# Patient Record
Sex: MALE | Race: WHITE | NOT HISPANIC OR LATINO | Employment: OTHER | URBAN - METROPOLITAN AREA
[De-identification: names, ages, dates, MRNs, and addresses within clinical notes are randomized per-mention and may not be internally consistent; named-entity substitution may affect disease eponyms.]

---

## 2020-06-15 ENCOUNTER — TRANSFERRED RECORDS (OUTPATIENT)
Dept: HEALTH INFORMATION MANAGEMENT | Facility: CLINIC | Age: 70
End: 2020-06-15

## 2020-07-22 ENCOUNTER — TRANSFERRED RECORDS (OUTPATIENT)
Dept: HEALTH INFORMATION MANAGEMENT | Facility: CLINIC | Age: 70
End: 2020-07-22

## 2020-11-13 ENCOUNTER — TRANSFERRED RECORDS (OUTPATIENT)
Dept: HEALTH INFORMATION MANAGEMENT | Facility: CLINIC | Age: 70
End: 2020-11-13

## 2021-02-11 ENCOUNTER — TRANSFERRED RECORDS (OUTPATIENT)
Dept: HEALTH INFORMATION MANAGEMENT | Facility: CLINIC | Age: 71
End: 2021-02-11

## 2021-02-16 ENCOUNTER — TRANSFERRED RECORDS (OUTPATIENT)
Dept: HEALTH INFORMATION MANAGEMENT | Facility: CLINIC | Age: 71
End: 2021-02-16

## 2021-03-02 ENCOUNTER — PATIENT OUTREACH (OUTPATIENT)
Dept: SURGERY | Facility: CLINIC | Age: 71
End: 2021-03-02

## 2021-03-02 ENCOUNTER — DOCUMENTATION ONLY (OUTPATIENT)
Dept: ONCOLOGY | Facility: CLINIC | Age: 71
End: 2021-03-02

## 2021-03-02 ENCOUNTER — TRANSCRIBE ORDERS (OUTPATIENT)
Dept: OTHER | Age: 71
End: 2021-03-02

## 2021-03-02 DIAGNOSIS — C18.9 MALIGNANT NEOPLASM OF COLON (H): ICD-10-CM

## 2021-03-02 DIAGNOSIS — C22.9 ADENOCARCINOMA OF LIVER (H): Primary | ICD-10-CM

## 2021-03-02 NOTE — TELEPHONE ENCOUNTER
New Patient Oncology Nurse Navigator Note     Referring provider: No primary care provider on file.    Referring Clinic/Organization: [facility]      Referred to: Surgical Oncology -  Hepatobiliary / GI Cancers     Requested provider (if applicable): Dr. Nikolay Chahal    Referral Received: 03/02/21       Evaluation for : Colon cancer with liver mets      Clinical History (per Nurse review of records provided):      - evaluation for microwave ablation of liver tumors.       No past medical history on file.    No past surgical history on file.    No current outpatient medications on file.         Not on File       Clinical Assessment / Barriers to Care (Per Nurse):    None at this time.     Records Location:     [facility] Everywhere   Faxed - Media tab/Scanned     Records Needed:     ABDOMINAL IMAGING (CT SCANS, MRI, US)  DATING BACK TO 2019      Additional testing needed prior to consult:     NONE AT THIS TIME    Referral updates and Plan:       Consult with Surgical Oncology         Krystle Rodriguez, RN, BSN   Surgical Oncology New Patient Nurse Navigator  [facility]  [phone]

## 2021-03-05 ENCOUNTER — VIRTUAL VISIT (OUTPATIENT)
Dept: SURGERY | Facility: CLINIC | Age: 71
End: 2021-03-05
Attending: SURGERY
Payer: COMMERCIAL

## 2021-03-05 DIAGNOSIS — C18.9 ADENOCARCINOMA OF COLON METASTATIC TO LIVER (H): Primary | ICD-10-CM

## 2021-03-05 DIAGNOSIS — C78.7 ADENOCARCINOMA OF COLON METASTATIC TO LIVER (H): Primary | ICD-10-CM

## 2021-03-05 PROCEDURE — 99203 OFFICE O/P NEW LOW 30 MIN: CPT | Mod: 95 | Performed by: SURGERY

## 2021-03-05 PROCEDURE — 999N001193 HC VIDEO/TELEPHONE VISIT; NO CHARGE

## 2021-03-05 RX ORDER — BISACODYL 5 MG/1
5 TABLET, DELAYED RELEASE ORAL PRN
COMMUNITY
Start: 2020-12-21 | End: 2021-05-20

## 2021-03-05 RX ORDER — ONDANSETRON 8 MG/1
8 TABLET, FILM COATED ORAL PRN
COMMUNITY
Start: 2021-02-16 | End: 2021-03-16

## 2021-03-05 RX ORDER — SPIRONOLACT/HYDROCHLOROTHIAZID 25 MG-25MG
2 TABLET ORAL EVERY MORNING
COMMUNITY

## 2021-03-05 RX ORDER — LORAZEPAM 0.5 MG/1
0.5 TABLET ORAL PRN
COMMUNITY
Start: 2021-02-01 | End: 2021-03-16

## 2021-03-05 RX ORDER — PROCHLORPERAZINE MALEATE 10 MG
10 TABLET ORAL PRN
COMMUNITY
Start: 2020-12-08 | End: 2021-03-16

## 2021-03-05 RX ORDER — DEXAMETHASONE 4 MG/1
4 TABLET ORAL PRN
COMMUNITY
Start: 2021-02-16 | End: 2021-03-16

## 2021-03-05 RX ORDER — METOPROLOL SUCCINATE 50 MG/1
50 TABLET, EXTENDED RELEASE ORAL AT BEDTIME
COMMUNITY
Start: 2020-09-14

## 2021-03-05 RX ORDER — TAMSULOSIN HYDROCHLORIDE 0.4 MG/1
0.4 CAPSULE ORAL AT BEDTIME
COMMUNITY
Start: 2021-01-04

## 2021-03-05 NOTE — PROGRESS NOTES
Hernandez is a 70 year old who is being evaluated via a billable video visit.      How would you like to obtain your AVS? Mail a copy  If the video visit is dropped, the invitation should be resent by: Send to e-mail at: [email or web address]  Will anyone else be joining your video visit? No      Video Start Time: 9:46 AM  Video-Visit Details    Type of service:  Video Visit    Video End Time:10:11 AM  VIRTUAL VISIT      NEW PATIENT VISIT      REASON FOR EVALUATION:  Recurrent metastatic colorectal cancer to the liver.      HISTORY OF PRESENT ILLNESS:  Mr. Aragon is a 70-year-old gentleman who was initially diagnosed with colon cancer back in 2013.  He had a T2 N0 ascending colon cancer which was resected.  Subsequently, he was found to have liver metastases, which was resected up in [place].  There were 2 lesions, one in segment 5 and one in segment 6.  He received adjuvant chemotherapy and completed that in 2014.  Subsequent to that, he did have recurrence in his left lower lobe, which was treated with left lower lobe lobectomy in 2016.  He has been following with medical oncology since that time.  More recently last July, hepatic recurrence was noted in segment 7 of the liver.  The patient received up-front chemotherapy and was eventually referred to the [facility] for [facility] of liver-directed surgical therapy.  [facility] at the [facility] for discussion regarding liver-directed surgical resection versus ablation.      Overall, Mr. Aragon is feeling well and does not have any specific cancer related complaints.  He has what appears to be a solitary metastasis in segment 7/8 of his liver on the dome, which lies immediately adjacent to his middle hepatic vein.  He did have an outside MRI, but the images were not available for my review today so I am reviewing a CT scan that was dated 11/2020.  I suspect given his receipt of chemotherapy this  lesion may even be smaller; however, the most recent imaging is not available to me at this point.      I discussed with Mr. Aragon that I certainly would agree that liver-directed surgical therapy would be appropriate in his case, either in the form of a minimally invasive resection or ablation of this tumor.  If the tumor has diminished in size to less than 3 cm, I would probably favor ablation given that this is his third recurrence, but that will depend on the tumor itself once we get his most recent imaging sent to us.  I did discuss with Mr. Aragon the risks of both resection and/or ablation leading up to surgery.  I discussed risks that include but are not limited to bleeding, infection, bile leak, venous thromboembolism, risks of unintentional injury to other structures and a small risk of requiring an open surgical procedure.  I offered that we would get his MRI sent to us as quickly as we could so we could review it and form a more definitive plan with him.      We will go ahead and get Mr. Aragon's MRI imaging into our system and we will plan to reach out to him to confirm a final plan as soon as we are able to do that.      A total of 25 minutes was spent on virtual video visit with Mr. Aragon.  An additional 15 minutes was spent in review of his history and available imaging.       Originating Location (pt. Location): Home    Distant Location (provider location):  [facility]     Platform used for Video Visit: Guerda     SEND EMAIL TO PT TO START VISIT    Carolyn Conley CMA on 3/5/2021 at 9:11 AM

## 2021-03-05 NOTE — LETTER
3/5/2021         RE: Hernandez Aragon  [address]  [place] [address]        Dear Colleague,    Thank you for referring your patient, Hernandez Aragon, to the [facility]. Please see a copy of my visit note below.    Hernandez is a 70 year old who is being evaluated via a billable video visit.      How would you like to obtain your AVS? Mail a copy  If the video visit is dropped, the invitation should be resent by: Send to e-mail at: [email or web address]  Will anyone else be joining your video visit? No      Video Start Time: 9:46 AM  Video-Visit Details    Type of service:  Video Visit    Video End Time:10:11 AM   Dictation on: 03/08/2021  8:52 AM by: RIN ZABALA [[id number]]       Originating Location (pt. Location): Home    Distant Location (provider location):  [facility]     Platform used for Video Visit: Affirm     SEND EMAIL TO PT TO START VISIT    Carolyn Conley CMA on 3/5/2021 at 9:11 AM      VIRTUAL VISIT      NEW PATIENT VISIT      REASON FOR EVALUATION:  Recurrent metastatic colorectal cancer to the liver.      HISTORY OF PRESENT ILLNESS:  Mr. Aragon is a 70-year-old gentleman who was initially diagnosed with colon cancer back in 2013.  He had a T2 N0 ascending colon cancer which was resected.  Subsequently, he was found to have liver metastases, which was resected up in [place].  There were 2 lesions, one in segment 5 and one in segment 6.  He received adjuvant chemotherapy and completed that in 2014.  Subsequent to that, he did have recurrence in his left lower lobe, which was treated with left lower lobe lobectomy in 2016.  He has been following with medical oncology since that time.  More recently last July, hepatic recurrence was noted in segment 7 of the liver.  The patient received up-front chemotherapy and was eventually referred to the [facility] for consideration of liver-directed surgical therapy.  [facility] at the [facility] for discussion regarding liver-directed surgical resection versus ablation.      Overall, Mr. Aragon is feeling well and does not have any specific cancer related complaints.  He has what appears to be a solitary metastasis in segment 7/8 of his liver on the dome, which lies immediately adjacent to his middle hepatic vein.  He did have an outside MRI, but the images were not available for my review today so I am reviewing a CT scan that was dated 11/2020.  I suspect given his receipt of chemotherapy this lesion may even be smaller; however, the most recent imaging is not available to me at this point.      I discussed with Mr. Aragon that I certainly would agree that liver-directed surgical therapy would be appropriate in his case, either in the form of a minimally invasive resection or ablation of this tumor.  If the tumor has diminished in size to less than 3 cm, I would probably favor ablation given that this is his third recurrence, but that will depend on the tumor itself once we get his most recent imaging sent to us.  I did discuss with Mr. Aragon the risks of both resection and/or ablation leading up to surgery.  I discussed risks that include but are not limited to bleeding, infection, bile leak, venous thromboembolism, risks of unintentional injury to other structures and a small risk of requiring an open surgical procedure.  I offered that we would get his MRI sent to us as quickly as we could so we could review it and form a more definitive plan with him.      We will go ahead and get Mr. Aragon's MRI imaging into our system and we will plan to reach out to him to confirm a final plan as soon as we are able to do that.      A total of 25 minutes was spent on virtual video visit with Mr. Aragon.  An additional 15 minutes was spent in review of his history and available imaging.       Again, thank you for allowing me to  participate in the care of your patient.        Sincerely,        Nikolay Chahal MD

## 2021-03-05 NOTE — PROGRESS NOTES
[facility], most recent office notes and pertinent records are copied below for office visit and provider review.

## 2021-03-10 ENCOUNTER — PREP FOR PROCEDURE (OUTPATIENT)
Dept: SURGERY | Facility: CLINIC | Age: 71
End: 2021-03-10

## 2021-03-10 DIAGNOSIS — C18.9 COLON CANCER METASTASIZED TO LIVER (H): Primary | ICD-10-CM

## 2021-03-10 DIAGNOSIS — C78.7 COLON CANCER METASTASIZED TO LIVER (H): Primary | ICD-10-CM

## 2021-03-11 NOTE — PROGRESS NOTES
Surgical Oncology RN Care Coordination Note:     Patient returned call and stated he his last chemotherapy infusion was 2/2/2021. Informed him that we are going to work on figure out OR date ASAP to get him scheduled as he has now been off treatment for about 5 weeks. Informed him that our scheduling team will be in touch to discuss date/time and also assist in scheduling PAC visit as well as pre procedure COVID testing. He verbalized understanding and is aware we will be reaching out to confirm surgical appointments.     Krystle Rodriguez RN, BSN  Care Coordinator

## 2021-03-11 NOTE — PROGRESS NOTES
Surgical Oncology RN Care Coordination Note:     Called and left a message for patient to call our office back to discuss recommendations for surgery and timing now that imaging has been received. Left my direct number for patient to call back.     Krystle Rodriguez RN, BSN  Care Coordinator

## 2021-03-15 ENCOUNTER — TELEPHONE (OUTPATIENT)
Dept: SURGERY | Facility: CLINIC | Age: 71
End: 2021-03-15

## 2021-03-15 ENCOUNTER — PATIENT OUTREACH (OUTPATIENT)
Dept: SURGERY | Facility: CLINIC | Age: 71
End: 2021-03-15

## 2021-03-15 PROBLEM — C78.7 COLON CANCER METASTASIZED TO LIVER (H): Status: ACTIVE | Noted: 2021-03-15

## 2021-03-15 PROBLEM — C18.9 COLON CANCER METASTASIZED TO LIVER (H): Status: ACTIVE | Noted: 2021-03-15

## 2021-03-15 NOTE — TELEPHONE ENCOUNTER
FUTURE VISIT INFORMATION      SURGERY INFORMATION:    Date: 21    Location: [facility] OR    Surgeon:  Tirso    Anesthesia Type:  General    Procedure: Laparoscopic partial hepatectomy, intraoperative ultrasound,    Consult: 3.5.21    RECORDS REQUESTED FROM:       Primary Care Provider: Simone    Most recent EKG+ Tracin20 [facility]    Action 3.15.21 MJ   Action Taken [facility]

## 2021-03-15 NOTE — PROGRESS NOTES
Surgical Oncology RN Care Coordination Note:     Received call from FRIDA Goodson at the oncology office confirming patient received chemotherapy on 2/16 and was disconnected on 2/18. Requested office note from Dr. Chahal visit is faxed to [phone], completed.     Orders placed or OR per Dr. Chahal, plan is for laparoscopic possible hand assisted partial hepatectomy with IOUS, possible microwave ablation of liver tumor.     Krystle Rodriguez RN, BSN  Care Coordinator

## 2021-03-15 NOTE — TELEPHONE ENCOUNTER
Received orders to schedule surgery with Dr. Chahal.    He is set for virtual PAC tomorrow at 9 AM.    I did let him know that we are potentially looking at 3/23 for surgery and he told me he has his second COVID vaccine is that date.     He is aware that he might not hear from us until tomorrow.    Sent message regarding COVID vaccine to the nurse.

## 2021-03-15 NOTE — TELEPHONE ENCOUNTER
Surgery is scheduled with Dr. Chahal on 4/6 at [facility].  Scheduled per orders.    H&P: to be completed by PAC: virtual visit 3/16    Additional appointments:   NO ADDITIONAL APPOINTMENTS NEEDED AT THIS TIME    [facility], Hernandez will let me know if he needs orders faxed    Post-op: 4/19 as a in person visit.    The RN completed the education regarding the surgery.     Patient will receive surgery arrival and start time from PAC.    The surgery packet was sent via US mail.    Patient will contact clinic near home to schedule COVID-19 test 2-4 days prior to surgery.    I called the patient and was able to confirm the scheduled information above.

## 2021-03-16 ENCOUNTER — VIRTUAL VISIT (OUTPATIENT)
Dept: SURGERY | Facility: CLINIC | Age: 71
End: 2021-03-16
Payer: COMMERCIAL

## 2021-03-16 ENCOUNTER — ANESTHESIA EVENT (OUTPATIENT)
Dept: SURGERY | Facility: CLINIC | Age: 71
End: 2021-03-16

## 2021-03-16 ENCOUNTER — PRE VISIT (OUTPATIENT)
Dept: SURGERY | Facility: CLINIC | Age: 71
End: 2021-03-16

## 2021-03-16 ENCOUNTER — TELEPHONE (OUTPATIENT)
Dept: SURGERY | Facility: CLINIC | Age: 71
End: 2021-03-16

## 2021-03-16 DIAGNOSIS — C18.9 COLON CANCER METASTASIZED TO LIVER (H): ICD-10-CM

## 2021-03-16 DIAGNOSIS — C78.7 COLON CANCER METASTASIZED TO LIVER (H): ICD-10-CM

## 2021-03-16 DIAGNOSIS — Z01.818 PRE-OP EVALUATION: Primary | ICD-10-CM

## 2021-03-16 PROCEDURE — 99204 OFFICE O/P NEW MOD 45 MIN: CPT | Mod: 95 | Performed by: PHYSICIAN ASSISTANT

## 2021-03-16 SDOH — HEALTH STABILITY: MENTAL HEALTH: HOW OFTEN DO YOU HAVE A DRINK CONTAINING ALCOHOL?: NOT ASKED

## 2021-03-16 SDOH — HEALTH STABILITY: MENTAL HEALTH: HOW MANY STANDARD DRINKS CONTAINING ALCOHOL DO YOU HAVE ON A TYPICAL DAY?: NOT ASKED

## 2021-03-16 SDOH — HEALTH STABILITY: MENTAL HEALTH: HOW OFTEN DO YOU HAVE 6 OR MORE DRINKS ON ONE OCCASION?: NOT ASKED

## 2021-03-16 ASSESSMENT — PAIN SCALES - GENERAL: PAINLEVEL: NO PAIN (0)

## 2021-03-16 ASSESSMENT — LIFESTYLE VARIABLES: TOBACCO_USE: 0

## 2021-03-16 NOTE — PROGRESS NOTES
Hernandez is a 70 year old who is being evaluated via a billable video visit.      How would you like to obtain your AVS? Mail a copy  If the video visit is dropped, the invitation should be resent by: Send to e-mail at: [email or web address]  Will anyone else be joining your video visit? wife    HPI         Review of Systems         Objective    Vitals - Patient Reported  Pain Score: No Pain (0)        Physical Exam     SRIRAM Sanchez LPN

## 2021-03-16 NOTE — H&P
Pre-Operative H & P         Video-Visit Details    Type of service:  Video Visit    Patient verbally consented to video service today: YES      Video Start Time: 0919  Video End Time (time video stopped): 0939    Originating Location (pt. Location): Home    Distant Location (provider location):  [facility] PREOPERAT[facility]     Mode of Communication:  Video Conference via PanÃ¨ve        CC:  Preoperative exam to assess for increased cardiopulmonary risk while undergoing surgery and anesthesia.    Date of Encounter: 3/16/2021  Primary Care Physician:  Michael Nichols  Associated diagnosis: colon cancer with liver mets    HPI  Hernandez Aragon is a 70 year old male who presents for pre-operative H & P in preparation for Laparoscopic partial hepatectomy, intraoperative ultrasound, possible microwave ablation of liver tumor with Dr. Chahal on 4/6/21 at [facility]. Patient is being evaluated for comorbid conditions of hypertension, thrombocytopenia    Mr. Aragon has history of colon cancer diagnosed in 2013. He is s/p resection. He was then found to have liver mets and underwent resection in [place]. he completed chemotherapy in 2014. He subsequently had recurrence in his LLL and is s/p LLL lobectomy in 2016. He has been following with medical oncology. He was found to have recurrence in his liver 7/2020. [facility] underwent [facility] but was then referred to Dr. Chahal to discuss surgical intervention. Recommendation was made for minimally invasive resection or ablation, pending review of recent imaging. Surgery team is now working on scheduling the above procedure.    History is obtained from the patient, his wife and chart review.      Past Medical History  Past Medical History:   Diagnosis Date     HTN (hypertension)      Thrombocytopenia (H)        Past Surgical History  Past Surgical History:    Procedure Laterality Date     COLONOSCOPY       HEMICOLECTOMY, RT/LT       LOBECTOMY LUNG      left lower lobe       Hx of Blood transfusions/reactions: denies     Hx of abnormal bleeding or anti-platelet use: denies    Steroid use in the last year: denies    Personal or FH with difficulty with Anesthesia:  denies    Prior to Admission Medications  Current Outpatient Medications   Medication Sig Dispense Refill     alum & mag hydroxide-simethicone 80 mL, diphenhydrAMINE 80 mL, lidocaine 80 mL magic mouthwash Take 5 mLs by mouth as needed       bisacodyl (DULCOLAX) 5 MG EC tablet Take 5 mg by mouth as needed        metoprolol succinate ER (TOPROL-XL) 50 MG 24 hr tablet Take 50 mg by mouth At Bedtime        Multiple Vitamins-Minerals (VITRUM SENIOR) TABS Take 2 tablets by mouth every morning        tamsulosin (FLOMAX) 0.4 MG capsule Take 0.4 mg by mouth At Bedtime         Allergies  No Known Allergies    Social History  Social History     Socioeconomic History     Marital status:      Spouse name: Not on file     Number of children: Not on file     Years of education: Not on file     Highest education level: Not on file   Occupational History     Not on file   Social Needs     Financial resource strain: Not on file     Food insecurity     Worry: Not on file     Inability: Not on file     Transportation needs     Medical: Not on file     Non-medical: Not on file   Tobacco Use     Smoking status: Former Smoker     Smokeless tobacco: Never Used     Tobacco comment: distant history   Substance and Sexual Activity     Alcohol use: Not on file     Comment: infrequent     Drug use: Not Currently     Sexual activity: Not on file   Lifestyle     Physical activity     Days per week: Not on file     Minutes per session: Not on file     Stress: Not on file   Relationships     Social connections     Talks on phone: Not on file     Gets together: Not on file     Attends Jain service: Not on file     Active member of  club or organization: Not on file     Attends meetings of clubs or organizations: Not on file     Relationship status: Not on file     Intimate partner violence     Fear of current or ex partner: Not on file     Emotionally abused: Not on file     Physically abused: Not on file     Forced sexual activity: Not on file   Other Topics Concern     Not on file   Social History Narrative     Not on file       Family History  Family History   Problem Relation Age of Onset     Anesthesia Reaction No family hx of            ROS/MED HX  ENT/Pulmonary:  - neg pulmonary ROS  (-) tobacco use   Neurologic:  - neg neurologic ROS     Cardiovascular:     (+) Dyslipidemia hypertension----- (-) taking anticoagulants/antiplatelets   METS/Exercise Tolerance:     Hematologic: Comments: thrombocytopenia      Musculoskeletal:  - neg musculoskeletal ROS     GI/Hepatic: Comment: Colon cancer with liver mets      Renal/Genitourinary:       Endo:  - neg endo ROS     Psychiatric/Substance Use:  - neg psychiatric ROS     Infectious Disease:  - neg infectious disease ROS     Malignancy:   (+) Malignancy, History of GI.GI CA  Active status post Surgery and Chemo.        Other:            The complete review of systems is negative other than noted in the HPI or here.    0 lbs 0 oz  Data Unavailable   There is no height or weight on file to calculate BMI.       Physical Exam  Constitutional: Awake, alert, cooperative, no apparent distress, and appears stated age.  Respiratory: non labored breathing  Neuropsychiatric: Calm, cooperative. Normal affect.       Please refer to the physical examination documented by the anesthesiologist in the anesthesia record on the day of surgery    Labs: (personally reviewed)  2/16/21  hgb 16.1, hematocrit 49, WBC 6.9, platelets 144  Sodium 140, K+ 4.1, chloride 101, BUN 23, Cr 1.1, Glucose 145      Outside records reviewed from: care everywhere    ASSESSMENT and PLAN  Hernandez Aragon is a 70 year old male scheduled for  Laparoscopic partial hepatectomy, intraoperative ultrasound, possible microwave ablation of liver tumor on 4/6/21 by Dr. Chahal in treatment of colon cancer with liver mets.  PAC referral for risk assessment and optimization for anesthesia with comorbid conditions of hypertension, thrombocytopenia:    Pre-operative considerations:    1.  Cardiac:  Functional status- METS >4- regularly walks dog 1-1.25 miles, golfs. Denies cardiac symptoms. Hypertension using metoprolol. Intermediate risk surgery with 0.9% (RCRI #) risk of major adverse cardiac event.    2.  Pulm:  Airway feasible.  NELLI risk: intermediate. non smoker. Denies pulmonary symptoms. COVID testing per surgery team.  H/o left lower lobectomy- patient and his wife report he had chest pain after this procedure that was deemed to be related to the surgery and not cardiac in origin.     3.  GI:  Risk of PONV score = 2.  If > 2, anti-emetic intervention recommended. h/o colon cancer with liver mets. s/p resection and chemotherpay. Now with recurrence- one liver lesion s/p chemotherpay with the above procedure planned.    4. : BPH using flomax    5. ENT: Patient reports chronic sinus drainage. Denies infections requiring antibiotics. He will follow up with PCP.    6. Heme: thrombocytopenia. Most recent platelets 144 2/16/21. Patient will come in tomorrow for T&S- I will complete extension form.     7. Access: left chest port in place    VTE risk: 3%    Patient is optimized and is acceptable candidate for the proposed procedure.  No further diagnostic evaluation is needed.    **Physical exam and vital signs not completed today as this visit was scheduled as a virtual visit during Covid 19 pandemic. Physical exam should be completed the DOS in pre-op**    55 minutes were spent completing chart review, seeing the patient, reviewing labs and test results, discussing patient care with anesthesia      Krystle Sandoval PA-C  Preoperative Assessment Center  [facility]  [facility] [place]  Phone: [phone]  Fax: [phone]

## 2021-03-16 NOTE — TELEPHONE ENCOUNTER
[facility] Call Center    Phone Message    May a detailed message be left on voicemail: yes     Reason for Call: Other: Hernandez is calling in requesting to speak with someone regarding his upcoming procedure on 4/6. Please call him back as soon as you can.     Action Taken: Message routed to:  [facility] ([facility]  [facility] SUR[facility]    Travel Screening: Not Applicable

## 2021-03-16 NOTE — PATIENT INSTRUCTIONS
Preparing for Your Surgery      Name:  Hernandez Aragon   MRN:  [id number]   :  1950   Today's Date:  3/16/2021       Arriving for surgery:  Surgery date:  21  Arrival time:  12:30 pm    Restrictions due to COVID 19:  One consistent visitor per patient is allowed.  The visitor will be allowed in the pre-op area.  Visitors are asked to leave the building during the surgery.  No ill visitors.  All visitors must wear face mask.     parking is available for anyone with mobility limitations or disabilities.  ([facility]  24 hours/ 7 days a week; [facility]  7 am- 3:30 pm, Mon- Fri)    Please come to:     [facility] [address]  [address] [place] [address]    - ? parking is available in front of the hospital      -    Please proceed to [address] on the 3rd floor. [phone]?     - ?If you are in need of directions, wheelchair or escort please stop at the Information Desk in the lobby.  Inform the information person that you are here for surgery; a wheelchair and escort to [address] will be provided.?     What can I eat or drink?  -  You may eat and drink normally for up to 8 hours before your surgery. (Until 6:30 am)  -  You may have clear liquids until 2 hours before surgery. (Until 12:30 pm)    Examples of clear liquids:  Water  Clear broth  Juices (apple, white grape, white cranberry  and cider) without pulp  Noncarbonated, powder based beverages  (lemonade and Gume-Aid)  Sodas (Sprite, 7-Up, ginger ale and seltzer)  Coffee or tea (without milk or cream)  Gatorade    -  No Alcohol for at least 24 hours before surgery     Which medicines can I take?    Hold Aspirin for 7 days before surgery.   Hold Multivitamins for 7 days before surgery.  Hold Supplements for 7 days before surgery.  Hold Ibuprofen (Advil, Motrin) for 1 day before surgery--unless otherwise directed by surgeon.  Hold Naproxen (Aleve) for 4 days before surgery.    -   DO NOT take these medications the day of surgery:         Bisacodyl (Dulcolax)    -  PLEASE TAKE these medications the day of surgery:    Magic Mouthwash if needed    How do I prepare myself?  - Please take 2 showers before surgery using Scrubcare or Hibiclens soap.    Use this soap only from the neck to your toes.     Leave the soap on your skin for one minute--then rinse thoroughly.      You may use your own shampoo and conditioner; no other hair products.   - Please remove all jewelry and body piercings.  - No lotions, deodorants or fragrance.  - Bring your ID and insurance card.    - All patients are required to have a Covid-19 test within 4 days of surgery/procedure.      -Patients will be contacted by the [facility] scheduling team within 1 week of surgery to make an appointment.      - Patients may call the Scheduling team at [phone] if they have not been scheduled within 4 days of  surgery.      ALL PATIENTS GOING HOME THE SAME DAY OF SURGERY ARE REQUIRED TO HAVE A RESPONSIBLE ADULT TO DRIVE AND BE IN ATTENDANCE WITH THEM FOR 24 HOURS FOLLOWING SURGERY.    IF THE RESPONSIBLE ADULT IS REQUIRED FOR POST OP TEACHING THE POST OP RN WILL ASK THEM TO COME BACK TO THE RECOVERY AREA.    Questions or Concerns:    - For any questions regarding the day of surgery or your hospital stay, please contact the Pre Admission Nursing Office at [phone].       - If you have health changes between today and your surgery please call your surgeon.       For questions after surgery please call your surgeons office.

## 2021-03-16 NOTE — ANESTHESIA PREPROCEDURE EVALUATION
Anesthesia Pre-Procedure Evaluation    Patient: Hernandez Aragon   MRN: [id number] : 1950        Preoperative Diagnosis: * No surgery found *   Procedure :      No past medical history on file.   No past surgical history on file.   No Known Allergies   Social History     Tobacco Use     Smoking status: Not on file   Substance Use Topics     Alcohol use: Not on file      Wt Readings from Last 1 Encounters:   No data found for Wt        Anesthesia Evaluation   Pt has had prior anesthetic. Type: General.    No history of anesthetic complications       ROS/MED HX  ENT/Pulmonary:  - neg pulmonary ROS  (-) tobacco use   Neurologic:  - neg neurologic ROS     Cardiovascular:     (+) Dyslipidemia hypertension----- (-) taking anticoagulants/antiplatelets   METS/Exercise Tolerance: >4 METS Comment: Golfing, walking dog 1-1.25 miles   Hematologic: Comments: thrombocytopenia   (-) history of blood transfusion   Musculoskeletal:  - neg musculoskeletal ROS     GI/Hepatic: Comment: Colon cancer with liver mets      Renal/Genitourinary:     (+) BPH,     Endo:  - neg endo ROS     Psychiatric/Substance Use:  - neg psychiatric ROS     Infectious Disease:  - neg infectious disease ROS     Malignancy:   (+) Malignancy, History of GI.GI CA  Active status post Surgery and Chemo.        Other:               OUTSIDE LABS:  CBC: No results found for: WBC, HGB, HCT, PLT  BMP: No results found for: NA, POTASSIUM, CHLORIDE, CO2, BUN, CR, GLC  COAGS: No results found for: PTT, INR, FIBR  POC: No results found for: BGM, HCG, HCGS  HEPATIC: No results found for: ALBUMIN, PROTTOTAL, ALT, AST, GGT, ALKPHOS, BILITOTAL, BILIDIRECT, TIM  OTHER: No results found for: PH, LACT, A1C, UTE, PHOS, MAG, LIPASE, AMYLASE, TSH, T4, T3, CRP, SED          PAC Discussion and Assessment    ASA Classification: 3  Case is suitable for: [place]  Anesthetic techniques and relevant risks discussed: GA                  PAC Resident/NP Anesthesia Assessment:  Hernandez Aragon is a 70 year old male scheduled for Laparoscopic partial hepatectomy, intraoperative ultrasound, possible microwave ablation of liver tumor on 4/6/21 by Dr. Chahal in treatment of colon cancer with liver mets.  PAC referral for risk assessment and optimization for anesthesia with comorbid conditions of hypertension, thrombocytopenia:    Pre-operative considerations:    1.  Cardiac:  Functional status- METS >4- regularly walks dog 1-1.25 miles, golfs. Denies cardiac symptoms. Hypertension using metoprolol. Intermediate risk surgery with 0.9% (RCRI #) risk of major adverse cardiac event.    2.  Pulm:  Airway feasible.  NELLI risk: intermediate. non smoker. Denies pulmonary symptoms. COVID testing per surgery team.  H/o left lower lobectomy- patient and his wife report he had chest pain after this procedure that was deemed to be related to the surgery and not cardiac in origin.     3.  GI:  Risk of PONV score = 2.  If > 2, anti-emetic intervention recommended. h/o colon cancer with liver mets. s/p resection and chemotherpay. Now with recurrence- one liver lesion s/p chemotherpay with the above procedure planned.    4. : BPH using flomax    5. ENT: Patient reports chronic sinus drainage. Denies infections requiring antibiotics. He will follow up with PCP.    6. Heme: thrombocytopenia. Most recent platelets 144 2/16/21. Patient will come in tomorrow for T&S- I will complete extension form.     7. Access: left chest port in place    VTE risk: 3%    Patient is optimized and is acceptable candidate for the proposed procedure.  No further diagnostic evaluation is needed.    **Physical exam and vital signs not completed today as this visit was scheduled as a virtual visit during Covid 19 pandemic. Physical exam should be completed the DOS in pre-op**    **For further details of assessment and testing please see H and P completed on same date.      JOSEPH Nunes  ELISE Sandoval PA-C

## 2021-03-16 NOTE — TELEPHONE ENCOUNTER
Received message that patient has questions about surgery on 4/6.    Called and spoke with wife, Unique.    She said that they are wondering which procedure Dr. Chahal is doing, or if he won't know until he is actually in surgery.    Will route to Krystle, the nurse, to follow up.    They are available to talk this afternoon, but tomorrow morning is preferred.

## 2021-03-16 NOTE — TELEPHONE ENCOUNTER
Records received 03/16/21    Facility  [facility]   Outcome 3/20/2015 ECG with tracings sent to urgent scanning - [facility]

## 2021-03-17 ENCOUNTER — PREP FOR PROCEDURE (OUTPATIENT)
Dept: SURGERY | Facility: CLINIC | Age: 71
End: 2021-03-17

## 2021-03-17 ENCOUNTER — CARE COORDINATION (OUTPATIENT)
Dept: SURGERY | Facility: CLINIC | Age: 71
End: 2021-03-17

## 2021-03-17 DIAGNOSIS — Z01.818 PRE-OP EVALUATION: ICD-10-CM

## 2021-03-17 PROCEDURE — 86901 BLOOD TYPING SEROLOGIC RH(D): CPT | Performed by: PATHOLOGY

## 2021-03-17 PROCEDURE — 86850 RBC ANTIBODY SCREEN: CPT | Performed by: PATHOLOGY

## 2021-03-17 PROCEDURE — 36415 COLL VENOUS BLD VENIPUNCTURE: CPT | Performed by: PATHOLOGY

## 2021-03-17 PROCEDURE — 86900 BLOOD TYPING SEROLOGIC ABO: CPT | Performed by: PATHOLOGY

## 2021-03-17 NOTE — ADDENDUM NOTE
Addendum  created 03/17/21 0720 by Krystle Sandoval PA-C    Diagnosis association updated, Order list changed

## 2021-03-17 NOTE — PROGRESS NOTES
Called to let Mr. Aragon know that we got his outside scans. Tumor measures slightly smaller after chemo - 3.2cm. Based on history of recurrence and the location which abbutts the middle hepatic vein, I recommended ablation rather than resection. He was in agreement and we will continue as planned.

## 2021-03-22 DIAGNOSIS — Z11.59 ENCOUNTER FOR SCREENING FOR OTHER VIRAL DISEASES: ICD-10-CM

## 2021-03-30 ENCOUNTER — PATIENT OUTREACH (OUTPATIENT)
Dept: SURGERY | Facility: CLINIC | Age: 71
End: 2021-03-30

## 2021-03-30 NOTE — PROGRESS NOTES
Surgical Oncology RN Care Coordination Note:     Received call from [facility] regarding concern of authorization not on file for patients pending surgery. Per discussion with Community care and patient, patient [facility] insurance should be billed first before his supplemental insurance. Per community care they will resend authorization for surgical services to [phone].     Krystle Rodriguez RN, BSN  Care Coordinator

## 2021-04-05 ENCOUNTER — ANESTHESIA EVENT (OUTPATIENT)
Dept: SURGERY | Facility: CLINIC | Age: 71
End: 2021-04-05
Payer: COMMERCIAL

## 2021-04-05 LAB — Lab: NEGATIVE

## 2021-04-06 ENCOUNTER — ANCILLARY PROCEDURE (OUTPATIENT)
Dept: ULTRASOUND IMAGING | Facility: CLINIC | Age: 71
End: 2021-04-06
Payer: COMMERCIAL

## 2021-04-06 ENCOUNTER — HOSPITAL ENCOUNTER (OUTPATIENT)
Facility: CLINIC | Age: 71
Discharge: HOME | End: 2021-04-06
Attending: SURGERY | Admitting: SURGERY
Payer: COMMERCIAL

## 2021-04-06 ENCOUNTER — ANESTHESIA (OUTPATIENT)
Dept: SURGERY | Facility: CLINIC | Age: 71
End: 2021-04-06
Payer: COMMERCIAL

## 2021-04-06 VITALS
DIASTOLIC BLOOD PRESSURE: 93 MMHG | RESPIRATION RATE: 17 BRPM | HEART RATE: 81 BPM | SYSTOLIC BLOOD PRESSURE: 168 MMHG | BODY MASS INDEX: 27.84 KG/M2 | OXYGEN SATURATION: 95 % | HEIGHT: 76 IN | WEIGHT: 228.62 LBS | TEMPERATURE: 97.9 F

## 2021-04-06 DIAGNOSIS — C18.9 COLON CANCER METASTASIZED TO LIVER (H): ICD-10-CM

## 2021-04-06 DIAGNOSIS — C78.7 COLON CANCER METASTASIZED TO LIVER (H): ICD-10-CM

## 2021-04-06 LAB
ABO + RH BLD: NORMAL
ABO + RH BLD: NORMAL
BLD GP AB SCN SERPL QL: NORMAL
BLOOD BANK CMNT PATIENT-IMP: NORMAL
BLOOD BANK CMNT PATIENT-IMP: NORMAL
CREAT SERPL-MCNC: 1.07 MG/DL (ref 0.66–1.25)
GFR SERPL CREATININE-BSD FRML MDRD: 69 ML/MIN/{1.73_M2}
GLUCOSE BLDC GLUCOMTR-MCNC: 108 MG/DL (ref 70–99)
HGB BLD-MCNC: 14.5 G/DL (ref 13.3–17.7)
INTERPRETATION ECG - MUSE: NORMAL
PLATELET # BLD AUTO: 108 10E9/L (ref 150–450)
POTASSIUM SERPL-SCNC: 3.9 MMOL/L (ref 3.4–5.3)
SPECIMEN EXP DATE BLD: NORMAL

## 2021-04-06 PROCEDURE — 82565 ASSAY OF CREATININE: CPT | Performed by: STUDENT IN AN ORGANIZED HEALTH CARE EDUCATION/TRAINING PROGRAM

## 2021-04-06 PROCEDURE — 93010 ELECTROCARDIOGRAM REPORT: CPT | Mod: 59 | Performed by: INTERNAL MEDICINE

## 2021-04-06 PROCEDURE — 370N000017 HC ANESTHESIA TECHNICAL FEE, PER MIN: Performed by: SURGERY

## 2021-04-06 PROCEDURE — 82962 GLUCOSE BLOOD TEST: CPT

## 2021-04-06 PROCEDURE — 250N000011 HC RX IP 250 OP 636: Performed by: ANESTHESIOLOGY

## 2021-04-06 PROCEDURE — 250N000025 HC SEVOFLURANE, PER MIN: Performed by: SURGERY

## 2021-04-06 PROCEDURE — 250N000011 HC RX IP 250 OP 636: Performed by: STUDENT IN AN ORGANIZED HEALTH CARE EDUCATION/TRAINING PROGRAM

## 2021-04-06 PROCEDURE — 250N000009 HC RX 250

## 2021-04-06 PROCEDURE — 84132 ASSAY OF SERUM POTASSIUM: CPT | Performed by: STUDENT IN AN ORGANIZED HEALTH CARE EDUCATION/TRAINING PROGRAM

## 2021-04-06 PROCEDURE — 710N000012 HC RECOVERY PHASE 2, PER MINUTE: Performed by: SURGERY

## 2021-04-06 PROCEDURE — 258N000003 HC RX IP 258 OP 636

## 2021-04-06 PROCEDURE — 999N000141 HC STATISTIC PRE-PROCEDURE NURSING ASSESSMENT: Performed by: SURGERY

## 2021-04-06 PROCEDURE — 360N000078 HC SURGERY LEVEL 5, PER MIN: Performed by: SURGERY

## 2021-04-06 PROCEDURE — 85018 HEMOGLOBIN: CPT | Performed by: STUDENT IN AN ORGANIZED HEALTH CARE EDUCATION/TRAINING PROGRAM

## 2021-04-06 PROCEDURE — 250N000011 HC RX IP 250 OP 636

## 2021-04-06 PROCEDURE — 258N000003 HC RX IP 258 OP 636: Performed by: STUDENT IN AN ORGANIZED HEALTH CARE EDUCATION/TRAINING PROGRAM

## 2021-04-06 PROCEDURE — 272N000001 HC OR GENERAL SUPPLY STERILE: Performed by: SURGERY

## 2021-04-06 PROCEDURE — 250N000011 HC RX IP 250 OP 636: Performed by: SURGERY

## 2021-04-06 PROCEDURE — C1888 ENDOVAS NON-CARDIAC ABL CATH: HCPCS | Performed by: SURGERY

## 2021-04-06 PROCEDURE — 250N000009 HC RX 250: Performed by: STUDENT IN AN ORGANIZED HEALTH CARE EDUCATION/TRAINING PROGRAM

## 2021-04-06 PROCEDURE — 36415 COLL VENOUS BLD VENIPUNCTURE: CPT | Performed by: STUDENT IN AN ORGANIZED HEALTH CARE EDUCATION/TRAINING PROGRAM

## 2021-04-06 PROCEDURE — 710N000010 HC RECOVERY PHASE 1, LEVEL 2, PER MIN: Performed by: SURGERY

## 2021-04-06 PROCEDURE — 85049 AUTOMATED PLATELET COUNT: CPT | Performed by: STUDENT IN AN ORGANIZED HEALTH CARE EDUCATION/TRAINING PROGRAM

## 2021-04-06 PROCEDURE — 258N000003 HC RX IP 258 OP 636: Performed by: ANESTHESIOLOGY

## 2021-04-06 PROCEDURE — 999N000054 HC STATISTIC EKG NON-CHARGEABLE

## 2021-04-06 RX ORDER — DEXAMETHASONE SODIUM PHOSPHATE 10 MG/ML
INJECTION, SOLUTION INTRAMUSCULAR; INTRAVENOUS PRN
Status: DISCONTINUED | OUTPATIENT
Start: 2021-04-06 | End: 2021-04-06

## 2021-04-06 RX ORDER — GLYCOPYRROLATE 0.2 MG/ML
INJECTION, SOLUTION INTRAMUSCULAR; INTRAVENOUS PRN
Status: DISCONTINUED | OUTPATIENT
Start: 2021-04-06 | End: 2021-04-06

## 2021-04-06 RX ORDER — HYDROMORPHONE HYDROCHLORIDE 1 MG/ML
.3-.5 INJECTION, SOLUTION INTRAMUSCULAR; INTRAVENOUS; SUBCUTANEOUS EVERY 10 MIN PRN
Status: DISCONTINUED | OUTPATIENT
Start: 2021-04-06 | End: 2021-04-06 | Stop reason: HOSPADM

## 2021-04-06 RX ORDER — ACETAMINOPHEN 325 MG/1
975 TABLET ORAL ONCE
Status: DISCONTINUED | OUTPATIENT
Start: 2021-04-06 | End: 2021-04-06 | Stop reason: HOSPADM

## 2021-04-06 RX ORDER — SODIUM CHLORIDE, SODIUM LACTATE, POTASSIUM CHLORIDE, CALCIUM CHLORIDE 600; 310; 30; 20 MG/100ML; MG/100ML; MG/100ML; MG/100ML
INJECTION, SOLUTION INTRAVENOUS CONTINUOUS
Status: DISCONTINUED | OUTPATIENT
Start: 2021-04-06 | End: 2021-04-06 | Stop reason: HOSPADM

## 2021-04-06 RX ORDER — LIDOCAINE HYDROCHLORIDE 20 MG/ML
INJECTION, SOLUTION INFILTRATION; PERINEURAL PRN
Status: DISCONTINUED | OUTPATIENT
Start: 2021-04-06 | End: 2021-04-06

## 2021-04-06 RX ORDER — OXYCODONE HYDROCHLORIDE 5 MG/1
5 TABLET ORAL
Status: DISCONTINUED | OUTPATIENT
Start: 2021-04-06 | End: 2021-04-06 | Stop reason: HOSPADM

## 2021-04-06 RX ORDER — NALOXONE HYDROCHLORIDE 0.4 MG/ML
0.4 INJECTION, SOLUTION INTRAMUSCULAR; INTRAVENOUS; SUBCUTANEOUS
Status: DISCONTINUED | OUTPATIENT
Start: 2021-04-06 | End: 2021-04-06 | Stop reason: HOSPADM

## 2021-04-06 RX ORDER — ACETAMINOPHEN 325 MG/1
650 TABLET ORAL
Status: DISCONTINUED | OUTPATIENT
Start: 2021-04-06 | End: 2021-04-06 | Stop reason: HOSPADM

## 2021-04-06 RX ORDER — NALOXONE HYDROCHLORIDE 0.4 MG/ML
0.2 INJECTION, SOLUTION INTRAMUSCULAR; INTRAVENOUS; SUBCUTANEOUS
Status: DISCONTINUED | OUTPATIENT
Start: 2021-04-06 | End: 2021-04-06 | Stop reason: HOSPADM

## 2021-04-06 RX ORDER — FENTANYL CITRATE 50 UG/ML
25-50 INJECTION, SOLUTION INTRAMUSCULAR; INTRAVENOUS
Status: DISCONTINUED | OUTPATIENT
Start: 2021-04-06 | End: 2021-04-06 | Stop reason: HOSPADM

## 2021-04-06 RX ORDER — CEFAZOLIN SODIUM 2 G/100ML
2 INJECTION, SOLUTION INTRAVENOUS SEE ADMIN INSTRUCTIONS
Status: DISCONTINUED | OUTPATIENT
Start: 2021-04-06 | End: 2021-04-06 | Stop reason: HOSPADM

## 2021-04-06 RX ORDER — HYDRALAZINE HYDROCHLORIDE 20 MG/ML
5 INJECTION INTRAMUSCULAR; INTRAVENOUS EVERY 10 MIN PRN
Status: COMPLETED | OUTPATIENT
Start: 2021-04-06 | End: 2021-04-06

## 2021-04-06 RX ORDER — OXYCODONE HYDROCHLORIDE 5 MG/1
5-10 TABLET ORAL EVERY 4 HOURS PRN
Qty: 30 TABLET | Refills: 0 | Status: SHIPPED | OUTPATIENT
Start: 2021-04-06 | End: 2021-05-20

## 2021-04-06 RX ORDER — PROPOFOL 10 MG/ML
INJECTION, EMULSION INTRAVENOUS PRN
Status: DISCONTINUED | OUTPATIENT
Start: 2021-04-06 | End: 2021-04-06

## 2021-04-06 RX ORDER — ONDANSETRON 2 MG/ML
4 INJECTION INTRAMUSCULAR; INTRAVENOUS EVERY 30 MIN PRN
Status: DISCONTINUED | OUTPATIENT
Start: 2021-04-06 | End: 2021-04-06 | Stop reason: HOSPADM

## 2021-04-06 RX ORDER — CEFAZOLIN SODIUM 2 G/100ML
2 INJECTION, SOLUTION INTRAVENOUS
Status: DISCONTINUED | OUTPATIENT
Start: 2021-04-06 | End: 2021-04-06 | Stop reason: HOSPADM

## 2021-04-06 RX ORDER — ALBUTEROL SULFATE 0.83 MG/ML
2.5 SOLUTION RESPIRATORY (INHALATION) EVERY 4 HOURS PRN
Status: DISCONTINUED | OUTPATIENT
Start: 2021-04-06 | End: 2021-04-06 | Stop reason: HOSPADM

## 2021-04-06 RX ORDER — EPHEDRINE SULFATE 50 MG/ML
INJECTION, SOLUTION INTRAMUSCULAR; INTRAVENOUS; SUBCUTANEOUS PRN
Status: DISCONTINUED | OUTPATIENT
Start: 2021-04-06 | End: 2021-04-06

## 2021-04-06 RX ORDER — LIDOCAINE 40 MG/G
CREAM TOPICAL
Status: DISCONTINUED | OUTPATIENT
Start: 2021-04-06 | End: 2021-04-06 | Stop reason: HOSPADM

## 2021-04-06 RX ORDER — FENTANYL CITRATE 50 UG/ML
INJECTION, SOLUTION INTRAMUSCULAR; INTRAVENOUS PRN
Status: DISCONTINUED | OUTPATIENT
Start: 2021-04-06 | End: 2021-04-06

## 2021-04-06 RX ORDER — HEPARIN SODIUM (PORCINE) LOCK FLUSH IV SOLN 100 UNIT/ML 100 UNIT/ML
5 SOLUTION INTRAVENOUS
Status: DISCONTINUED | OUTPATIENT
Start: 2021-04-06 | End: 2021-04-06 | Stop reason: HOSPADM

## 2021-04-06 RX ORDER — ESMOLOL HYDROCHLORIDE 10 MG/ML
INJECTION INTRAVENOUS PRN
Status: DISCONTINUED | OUTPATIENT
Start: 2021-04-06 | End: 2021-04-06

## 2021-04-06 RX ORDER — BUPIVACAINE HYDROCHLORIDE 2.5 MG/ML
INJECTION, SOLUTION EPIDURAL; INFILTRATION; INTRACAUDAL PRN
Status: DISCONTINUED | OUTPATIENT
Start: 2021-04-06 | End: 2021-04-06

## 2021-04-06 RX ORDER — ONDANSETRON 2 MG/ML
INJECTION INTRAMUSCULAR; INTRAVENOUS PRN
Status: DISCONTINUED | OUTPATIENT
Start: 2021-04-06 | End: 2021-04-06

## 2021-04-06 RX ORDER — FLUMAZENIL 0.1 MG/ML
0.2 INJECTION, SOLUTION INTRAVENOUS
Status: DISCONTINUED | OUTPATIENT
Start: 2021-04-06 | End: 2021-04-06 | Stop reason: HOSPADM

## 2021-04-06 RX ORDER — OXYCODONE HYDROCHLORIDE 5 MG/1
5 TABLET ORAL EVERY 4 HOURS PRN
Status: DISCONTINUED | OUTPATIENT
Start: 2021-04-06 | End: 2021-04-06 | Stop reason: HOSPADM

## 2021-04-06 RX ORDER — DEXAMETHASONE SODIUM PHOSPHATE 4 MG/ML
INJECTION, SOLUTION INTRA-ARTICULAR; INTRALESIONAL; INTRAMUSCULAR; INTRAVENOUS; SOFT TISSUE PRN
Status: DISCONTINUED | OUTPATIENT
Start: 2021-04-06 | End: 2021-04-06

## 2021-04-06 RX ORDER — FENTANYL CITRATE 50 UG/ML
25-50 INJECTION, SOLUTION INTRAMUSCULAR; INTRAVENOUS EVERY 5 MIN PRN
Status: DISCONTINUED | OUTPATIENT
Start: 2021-04-06 | End: 2021-04-06 | Stop reason: HOSPADM

## 2021-04-06 RX ORDER — ONDANSETRON 4 MG/1
4 TABLET, ORALLY DISINTEGRATING ORAL EVERY 30 MIN PRN
Status: DISCONTINUED | OUTPATIENT
Start: 2021-04-06 | End: 2021-04-06 | Stop reason: HOSPADM

## 2021-04-06 RX ADMIN — PHENYLEPHRINE HYDROCHLORIDE 200 MCG: 10 INJECTION INTRAVENOUS at 12:44

## 2021-04-06 RX ADMIN — PHENYLEPHRINE HYDROCHLORIDE 100 MCG: 10 INJECTION INTRAVENOUS at 13:26

## 2021-04-06 RX ADMIN — FENTANYL CITRATE 50 MCG: 50 INJECTION, SOLUTION INTRAMUSCULAR; INTRAVENOUS at 15:19

## 2021-04-06 RX ADMIN — HYDROMORPHONE HYDROCHLORIDE 0.5 MG: 1 INJECTION, SOLUTION INTRAMUSCULAR; INTRAVENOUS; SUBCUTANEOUS at 15:38

## 2021-04-06 RX ADMIN — LIDOCAINE HYDROCHLORIDE 100 MG: 20 INJECTION, SOLUTION INFILTRATION; PERINEURAL at 12:39

## 2021-04-06 RX ADMIN — FENTANYL CITRATE 50 MCG: 50 INJECTION, SOLUTION INTRAMUSCULAR; INTRAVENOUS at 15:09

## 2021-04-06 RX ADMIN — ESMOLOL HYDROCHLORIDE 10 MG: 10 INJECTION, SOLUTION INTRAVENOUS at 14:48

## 2021-04-06 RX ADMIN — DEXMEDETOMIDINE HYDROCHLORIDE 40 MCG: 100 INJECTION, SOLUTION INTRAVENOUS at 11:28

## 2021-04-06 RX ADMIN — Medication 5 MG: at 13:24

## 2021-04-06 RX ADMIN — SUGAMMADEX 200 MG: 100 INJECTION, SOLUTION INTRAVENOUS at 14:36

## 2021-04-06 RX ADMIN — FENTANYL CITRATE 200 MCG: 50 INJECTION, SOLUTION INTRAMUSCULAR; INTRAVENOUS at 12:39

## 2021-04-06 RX ADMIN — HYDRALAZINE HYDROCHLORIDE 5 MG: 20 INJECTION, SOLUTION INTRAMUSCULAR; INTRAVENOUS at 16:05

## 2021-04-06 RX ADMIN — ONDANSETRON 4 MG: 2 INJECTION INTRAMUSCULAR; INTRAVENOUS at 14:20

## 2021-04-06 RX ADMIN — FENTANYL CITRATE 50 MCG: 50 INJECTION, SOLUTION INTRAMUSCULAR; INTRAVENOUS at 15:02

## 2021-04-06 RX ADMIN — ROCURONIUM BROMIDE 10 MG: 10 INJECTION INTRAVENOUS at 14:07

## 2021-04-06 RX ADMIN — FENTANYL CITRATE 50 MCG: 50 INJECTION, SOLUTION INTRAMUSCULAR; INTRAVENOUS at 14:14

## 2021-04-06 RX ADMIN — CEFAZOLIN 2 G: 10 INJECTION, POWDER, FOR SOLUTION INTRAVENOUS at 12:48

## 2021-04-06 RX ADMIN — HEPARIN SODIUM (PORCINE) LOCK FLUSH IV SOLN 100 UNIT/ML 5 ML: 100 SOLUTION at 17:49

## 2021-04-06 RX ADMIN — ROCURONIUM BROMIDE 60 MG: 10 INJECTION INTRAVENOUS at 12:39

## 2021-04-06 RX ADMIN — DEXAMETHASONE SODIUM PHOSPHATE 2 MG: 10 INJECTION, SOLUTION INTRAMUSCULAR; INTRAVENOUS at 11:28

## 2021-04-06 RX ADMIN — PHENYLEPHRINE HYDROCHLORIDE 200 MCG: 10 INJECTION INTRAVENOUS at 12:50

## 2021-04-06 RX ADMIN — PROPOFOL 200 MG: 10 INJECTION, EMULSION INTRAVENOUS at 12:39

## 2021-04-06 RX ADMIN — Medication 5 MG: at 13:05

## 2021-04-06 RX ADMIN — GLYCOPYRROLATE 0.2 MG: 0.2 INJECTION, SOLUTION INTRAMUSCULAR; INTRAVENOUS at 13:24

## 2021-04-06 RX ADMIN — BUPIVACAINE HYDROCHLORIDE 70 ML: 2.5 INJECTION, SOLUTION EPIDURAL; INFILTRATION; INTRACAUDAL; PERINEURAL at 11:28

## 2021-04-06 RX ADMIN — HYDROMORPHONE HYDROCHLORIDE 0.5 MG: 1 INJECTION, SOLUTION INTRAMUSCULAR; INTRAVENOUS; SUBCUTANEOUS at 16:05

## 2021-04-06 RX ADMIN — HYDRALAZINE HYDROCHLORIDE 5 MG: 20 INJECTION, SOLUTION INTRAMUSCULAR; INTRAVENOUS at 15:42

## 2021-04-06 RX ADMIN — HYDROMORPHONE HYDROCHLORIDE 0.5 MG: 1 INJECTION, SOLUTION INTRAMUSCULAR; INTRAVENOUS; SUBCUTANEOUS at 15:51

## 2021-04-06 RX ADMIN — FENTANYL CITRATE 50 MCG: 50 INJECTION, SOLUTION INTRAMUSCULAR; INTRAVENOUS at 11:19

## 2021-04-06 RX ADMIN — SODIUM CHLORIDE, POTASSIUM CHLORIDE, SODIUM LACTATE AND CALCIUM CHLORIDE: 600; 310; 30; 20 INJECTION, SOLUTION INTRAVENOUS at 12:22

## 2021-04-06 RX ADMIN — Medication 10 MG: at 12:53

## 2021-04-06 RX ADMIN — PHENYLEPHRINE HYDROCHLORIDE 100 MCG: 10 INJECTION INTRAVENOUS at 13:05

## 2021-04-06 RX ADMIN — DEXAMETHASONE SODIUM PHOSPHATE 4 MG: 4 INJECTION, SOLUTION INTRA-ARTICULAR; INTRALESIONAL; INTRAMUSCULAR; INTRAVENOUS; SOFT TISSUE at 12:52

## 2021-04-06 RX ADMIN — FENTANYL CITRATE 50 MCG: 50 INJECTION, SOLUTION INTRAMUSCULAR; INTRAVENOUS at 15:24

## 2021-04-06 RX ADMIN — ENOXAPARIN SODIUM 40 MG: 40 INJECTION SUBCUTANEOUS at 11:34

## 2021-04-06 ASSESSMENT — MIFFLIN-ST. JEOR: SCORE: 1888.5

## 2021-04-06 NOTE — OR NURSING
Discharge instructions reviewed with patient and responsible adult, Unique whom is at the bedside. All questions regarding this information answered at this time. AVS papers printed and given to patient to give to responsible adult. Patient and responsible adult verbalized understanding of discharge instructions and understand where to find the phone number to call with any questions or concerns.

## 2021-04-06 NOTE — ANESTHESIA PROCEDURE NOTES
Airway       Patient location during procedure: OR  Staff -        Other Anesthesia Staff: Dax Alvares       Performed By: SRNAIndications and Patient Condition       Indications for airway management: patrick-procedural       Induction type:intravenous       Mask difficulty assessment: 2 - vent by mask + OA or adjuvant +/- NMBA    Final Airway Details       Final airway type: endotracheal airway       Successful airway: ETT - single  Endotracheal Airway Details        ETT size (mm): 8.0       Cuffed: yes       Cuff volume (mL): 10       Successful intubation technique: direct laryngoscopy       DL Blade Type: Daniels 2       Grade View of Cords: 1 (cords open and clear)       Adjucts: stylet       Position: Right       Measured from: lips       Secured at (cm): 24       Bite block used: None    Post intubation assessment        Placement verified by: capnometry, equal breath sounds and chest rise        Number of attempts at approach: 1       Number of other approaches attempted: 0       Secured with: pink tape       Ease of procedure: easy       Dentition: Intact and Unchanged    Medication(s) Administered   Medication Administration Time: 4/6/2021 12:42 PM

## 2021-04-06 NOTE — ANESTHESIA PROCEDURE NOTES
TAP Procedure Note  Pre-Procedure   Staff -        Anesthesiologist:  Isaac Good MD       Resident/Fellow: Deb Dailey MD       Performed By: resident       Location: pre-op       Pre-Anesthestic Checklist: patient identified, IV checked, site marked, risks and benefits discussed, informed consent, monitors and equipment checked, pre-op evaluation, at physician/surgeon's request and post-op pain management  Timeout:       Correct Patient: Yes        Correct Procedure: Yes        Correct Site: Yes        Correct Position: Yes        Correct Laterality: Yes        Site Marked: Yes  Procedure Documentation  Procedure: TAP       Diagnosis: POST OPERATIVE PAIN       Laterality: bilateral       Patient Position: supine       Patient Prep/Sterile Barriers: sterile gloves, mask       Skin prep: Chloraprep       Needle Type: short bevel       Needle Gauge: 21.        Needle Length (millimeters): 110        - Ultrasound guided       - Ultrasound used to identify targeted nerve, plexus, vascular marker, or fascial plane and place a needle adjacent to it in real-time       - Ultrasound was used to visualize the spread of anesthetic in close proximity to the above referenced structure       - A permanent image is entered into the patient's record.    Assessment/Narrative         The placement was negative for: blood aspirated, painful injection and site bleeding       Paresthesias: No.     Bolus given via needle..        Secured via.        Insertion/Infusion Method: Single Shot       Complications: none       Injection made incrementally with aspirations every 5 mL.

## 2021-04-06 NOTE — ANESTHESIA CARE TRANSFER NOTE
Patient: Hernandez Aragon    Procedure(s):  Laparoscopic ultrasound guided microwave ablation of liver tumor, lysis of adhesions  Laparoscopic partial hepatectomy    Diagnosis: Colon cancer metastasized to liver (H) [C18.9, C78.7]  Diagnosis Additional Information: No value filed.    Anesthesia Type:   General, Peripheral Nerve Block     Note:    Oropharynx: oropharynx clear of all foreign objects and spontaneously breathing  Level of Consciousness: drowsy  Oxygen Supplementation: nasal cannula    Independent Airway: airway patency satisfactory and stable  Dentition: dentition unchanged  Vital Signs Stable: post-procedure vital signs reviewed and stable  Report to RN Given: handoff report given  Patient transferred to: PACU  Comments: Patient transferred to PACU in stable condition, breathing spontaneously on NC, VSS.  Report given to RN.  Handoff Report: Identifed the Patient, Identified the Reponsible Provider, Reviewed the pertinent medical history, Discussed the surgical course, Reviewed Intra-OP anesthesia mangement and issues during anesthesia, Set expectations for post-procedure period and Allowed opportunity for questions and acknowledgement of understanding      Vitals: (Last set prior to Anesthesia Care Transfer)  CRNA VITALS  4/6/2021 1415 - 4/6/2021 1453      4/6/2021             Resp Rate (observed):  (!) 7        Electronically Signed By: YOSSI Dale CRNA  April 6, 2021  2:53 PM

## 2021-04-06 NOTE — BRIEF OP NOTE
[facility]    Brief Operative Note    Pre-operative diagnosis: Colon cancer metastasized to liver (H) [C18.9, C78.7]  Post-operative diagnosis Same as pre-operative diagnosis    Procedure: Procedure(s):  Laparoscopic ultrasound guided microwave ablation of liver tumor, lysis of adhesions  Laparoscopic partial hepatectomy  Surgeon: Surgeon(s) and Role:     * Nikolay Chahal MD - Primary  Anesthesia: Combined General with Block   Estimated blood loss: Minimal  Drains: None  Specimens: * No specimens in log *  Findings:   None.  Complications: None.  Implants: * No implants in log *

## 2021-04-06 NOTE — ANESTHESIA PREPROCEDURE EVALUATION
Anesthesia Pre-Procedure Evaluation    Patient: Hernandez Aragon   MRN: [id number] : 1950        Preoperative Diagnosis: Colon cancer metastasized to liver (H) [C18.9, C78.7]   Procedure : Procedure(s):  Laparoscopic ultrasound guided microwave ablation of liver tumor  Laparoscopic partial hepatectomy     Hernandez Aragon is a 70 year old male who presents for pre-operative H & P in preparation for Laparoscopic partial hepatectomy, intraoperative ultrasound, possible microwave ablation of liver tumor with Dr. Chahal on 21 at [facility]. Patient is being evaluated for comorbid conditions of hypertension, thrombocytopenia     Mr. Aragon has history of colon cancer diagnosed in . He is s/p resection. He was then found to have liver mets and underwent resection in [place]. he completed chemotherapy in . He subsequently had recurrence in his LLL and is s/p LLL lobectomy in . He has been following with medical oncology. He was found to have recurrence in his liver 2020. He underwent chemotherapy [facility], but was then referred to Dr. Chahal to discuss surgical intervention. Recommendation was made for minimally invasive resection or ablation, pending review of recent imaging. Surgery team is now working on scheduling the above procedure.    Past Medical History:   Diagnosis Date     HTN (hypertension)      Thrombocytopenia (H)       Past Surgical History:   Procedure Laterality Date     COLONOSCOPY       HEMICOLECTOMY, RT/LT       LOBECTOMY LUNG      left lower lobe      No Known Allergies   Social History     Tobacco Use     Smoking status: Former Smoker     Smokeless tobacco: Never Used     Tobacco comment: distant history   Substance Use Topics     Alcohol use: Not on file     Comment: infrequent      Wt Readings from Last 1 Encounters:   21 103.7 kg (228 lb 9.9 oz)        Anesthesia  Evaluation            ROS/MED HX  ENT/Pulmonary:       Neurologic:       Cardiovascular:     (+) hypertension----- (-) murmur and wheezes   METS/Exercise Tolerance:     Hematologic:       Musculoskeletal:       GI/Hepatic:     (+) liver disease,     Renal/Genitourinary:       Endo:       Psychiatric/Substance Use:       Infectious Disease:       Malignancy:       Other:            Physical Exam    Airway        Mallampati: II   TM distance: > 3 FB   Neck ROM: full   Mouth opening: > 3 cm    Respiratory Devices and Support         Dental  no notable dental history         Cardiovascular          Rhythm and rate: regular and normal (-) no systolic click and no murmur    Pulmonary   pulmonary exam normal        breath sounds clear to auscultation   (-) no wheezes        OUTSIDE LABS:  CBC:   Lab Results   Component Value Date    HGB 14.5 04/06/2021     (L) 04/06/2021     BMP:   Lab Results   Component Value Date    POTASSIUM 3.9 04/06/2021    CR 1.07 04/06/2021     COAGS: No results found for: PTT, INR, FIBR  POC:   Lab Results   Component Value Date     (H) 04/06/2021     HEPATIC: No results found for: ALBUMIN, PROTTOTAL, ALT, AST, GGT, ALKPHOS, BILITOTAL, BILIDIRECT, TIM  OTHER: No results found for: PH, LACT, A1C, UTE, PHOS, MAG, LIPASE, AMYLASE, TSH, T4, T3, CRP, SED    Anesthesia Plan    ASA Status:  3   NPO Status:  NPO Appropriate    Anesthesia Type: General.     - Airway: ETT   Induction: Intravenous.   Maintenance: Inhalation.   Techniques and Equipment:     - Lines/Monitors: 2nd IV, Arterial Line     Consents    Anesthesia Plan(s) and associated risks, benefits, and realistic alternatives discussed. Questions answered and patient/representative(s) expressed understanding.     - Discussed with:  Patient      - Extended Intubation/Ventilatory Support Discussed: Yes.      - Patient is DNR/DNI Status: No    Use of blood products discussed: Yes.     - Discussed with: Patient.     Postoperative  Care    Pain management: IV analgesics.   PONV prophylaxis: Ondansetron (or other 5HT-3), Dexamethasone or Solumedrol     Comments:                Christopher J. Behrens, MD

## 2021-04-06 NOTE — ANESTHESIA POSTPROCEDURE EVALUATION
Patient: Hernandez Aragon    Procedure(s):  Laparoscopic ultrasound guided microwave ablation of liver tumor, lysis of adhesions  Laparoscopic partial hepatectomy    Diagnosis:Colon cancer metastasized to liver (H) [C18.9, C78.7]  Diagnosis Additional Information: No value filed.    Anesthesia Type:  General, Peripheral Nerve Block    Note:  Disposition: Outpatient   Postop Pain Control: Uneventful            Sign Out: Well controlled pain   PONV: No   Neuro/Psych: Uneventful            Sign Out: Acceptable/Baseline neuro status   Airway/Respiratory: Uneventful            Sign Out: Acceptable/Baseline resp. status   CV/Hemodynamics: Uneventful            Sign Out: Acceptable CV status   Other NRE: NONE   DID A NON-ROUTINE EVENT OCCUR? No         Last vitals:  Vitals:    04/06/21 1515 04/06/21 1530 04/06/21 1545   BP: (!) 161/93 (!) 181/88 (!) 179/100   Pulse: 53 68 70   Resp: 12 16 14   Temp: 36.4  C (97.5  F) 36  C (96.8  F) 36  C (96.8  F)   SpO2: 97% 98% 95%       Last vitals prior to Anesthesia Care Transfer:  CRNA VITALS  4/6/2021 1415 - 4/6/2021 1515      4/6/2021             NIBP:  (!) 196/105    Pulse:  72    NIBP Mean:  140    Temp:  36.4  C (97.5  F)    SpO2:  99 %    Resp Rate (observed):  18    EKG:  Sinus rhythm          Electronically Signed By: Christopher J. Behrens, MD  April 6, 2021  3:51 PM

## 2021-04-06 NOTE — DISCHARGE INSTRUCTIONS
[facility]  Same-Day Surgery   Adult Discharge Orders & Instructions     For 24 hours after surgery    1. Get plenty of rest.  A responsible adult must stay with you for at least 24 hours after you leave the hospital.   2. Do not drive or use heavy equipment.  If you have weakness or tingling, don't drive or use heavy equipment until this feeling goes away.  3. Do not drink alcohol.  4. Avoid strenuous or risky activities.  Ask for help when climbing stairs.   5. You may feel lightheaded.  IF so, sit for a few minutes before standing.  Have someone help you get up.   6. If you have nausea (feel sick to your stomach): Drink only clear liquids such as apple juice, ginger ale, broth or 7-Up.  Rest may also help.  Be sure to drink enough fluids.  Move to a regular diet as you feel able.  7. You may have a slight fever. Call the doctor if your fever is over 100 F (37.7 C) (taken under the tongue) or lasts longer than 24 hours.  8. You may have a dry mouth, a sore throat, muscle aches or trouble sleeping.  These should go away after 24 hours.  9. Do not make important or legal decisions.   Call your doctor for any of the followin.  Signs of infection (fever, growing tenderness at the surgery site, a large amount of drainage or bleeding, severe pain, foul-smelling drainage, redness, swelling).    2. It has been over 8 to 10 hours since surgery and you are still not able to urinate (pass water).    3.  Headache for over 24 hours.      To contact a doctor, call Dr. Nikolay Chahal's office  [phone] (clinic) or:    X   [phone] and ask for the resident on call for  (answered 24 hours a day)  X   Emergency Department: [facility]: [phone]       (TTY for hearing impaired: [phone])         Tips for taking pain medications  To get the best pain relief possible , remember these points:      Take pain medications as directed, before pain becomes severe      Pain  medication can upset your stomach: taking it with food may help      Constipation is a common side effect of pain medication. Drink plenty of  Fluids      Eat foods high in fiber. Take a stool softener  if recommended by your doctor or  Pharmacist.        Do not drink alcohol, drive or operate machinery while taking pain medications.      Ask about other ways to control pain, such as with heat, ice or relaxation.

## 2021-04-06 NOTE — PROGRESS NOTES
Temp:  [98.4  F (36.9  C)] 98.4  F (36.9  C)  Pulse:  [59-74] 60  Resp:  [10-16] 14  BP: (142-172)/(74-89) 148/82  SpO2:  [97 %-99 %] 99 %   Bilateral TAP block performed without complications, no versed, and 50 mcg fentanyl given.  NSR, VSS, 2L O2 via NC.  Pt tolerated well.  Will continue to monitor.

## 2021-04-07 NOTE — OP NOTE
Procedure Date: 04/06/2021      SURGEON:  Nikolay Chahal MD      FIRST ASSISTANT:  Michael Campos MD, PGY-5      PREOPERATIVE DIAGNOSIS:  Metastatic colorectal cancer to the liver.      POSTOPERATIVE DIAGNOSES:   1.  Intraoperative adhesions.   2.  Colon cancer metastasized to the liver.      PROCEDURES:   1.  Diagnostic laparoscopy.   2.  Extensive laparoscopic lysis of adhesions lasting over an hour.   3.  Laparoscopic hepatic ultrasound.   4.  Ultrasound-guided percutaneous microwave ablation of liver tumor.      ANESTHESIA:  General.      ESTIMATED BLOOD LOSS:  Less than 10 mL.      SPECIMENS:  None.      COMPLICATIONS:  None.      DESCRIPTION OF PROCEDURE:  Mr. Aragon was put to sleep by Anesthesia, prepped and draped sterilely.  Pause for the cause was performed and was accurate.  We entered the abdomen using an open technique in the periumbilical position and placed a 12 mm trocar.  We insufflated the abdomen and were immediately met with significant intraabdominal adhesions from the patient's previous colectomy, as well as an open cholecystectomy.  We spent over an hour taking down adhesions, particularly in the right upper quadrant along his previous right subcostal incision.  Similarly, the liver itself was adhesed densely to the diaphragm, which led to a fairly tedious dissection.  Having said that, ultimately we were able to free up the liver and get over the anterior surface.  Once that was accomplished, we were able to identify an abnormality on the surface of the liver consistent with the patient's known tumor.  It lay in segment 8/4A.  We then performed an intraoperative hepatic ultrasound to further delineate the tumor, which was approximately 3.5 cm in size.  It was perhaps slightly larger than that in the transverse dimension and was somewhat irregular in shape and was more ovoid than round.  Because of the shape of the tumor, I decided to target it with 3 SR25 microwave ablation probes in order  to achieve a fairly large burn, given the size of the tumor and its configuration.  Of note, the lesion also did abut the middle hepatic vein.  We used ultrasound guidance to place percutaneous ablation probes into the tumor.  Once we were satisfied with the targeting, we performed a 65 watt, 10-minute burn.  Our initial burn, we targeted the deeper portion of the tumor and then just to be absolutely sure of complete ablation, we repositioned 2 of the needles into the more superficial tumor and burned an additional 5-minute burn.  With that, there was clear evidence of excellent ablation surrounding the tumor.  Our needles were removed.  Hemostasis was excellent throughout.  Ports were removed and the abdomen was desufflated.  The 12 mm port sites were closed using #1 Vicryl for the fascia, followed by 4-0 Monocryl, followed by Dermabond.  The patient tolerated the procedure well, was extubated in the operating room, transferred to recovery room in stable condition.        I was present throughout the entire procedure as described.         RIN ZABALA MD             D: 2021   T: 2021   MT: JUDI      Name:     LILIBETH JOE   MRN:      [id number]        Account:        [id number]   :      1950           Procedure Date: 2021      Document: [id number]

## 2021-04-19 ENCOUNTER — OFFICE VISIT (OUTPATIENT)
Dept: SURGERY | Facility: CLINIC | Age: 71
End: 2021-04-19
Attending: SURGERY
Payer: COMMERCIAL

## 2021-04-19 ENCOUNTER — HOSPITAL ENCOUNTER (OUTPATIENT)
Dept: MRI IMAGING | Facility: CLINIC | Age: 71
End: 2021-04-19
Attending: SURGERY
Payer: COMMERCIAL

## 2021-04-19 VITALS
OXYGEN SATURATION: 98 % | BODY MASS INDEX: 27.81 KG/M2 | HEART RATE: 72 BPM | TEMPERATURE: 98.4 F | WEIGHT: 228.5 LBS | DIASTOLIC BLOOD PRESSURE: 90 MMHG | SYSTOLIC BLOOD PRESSURE: 163 MMHG

## 2021-04-19 DIAGNOSIS — C78.7 ADENOCARCINOMA OF COLON METASTATIC TO LIVER (H): ICD-10-CM

## 2021-04-19 DIAGNOSIS — C18.9 ADENOCARCINOMA OF COLON METASTATIC TO LIVER (H): Primary | ICD-10-CM

## 2021-04-19 DIAGNOSIS — C18.9 ADENOCARCINOMA OF COLON METASTATIC TO LIVER (H): ICD-10-CM

## 2021-04-19 DIAGNOSIS — C78.7 ADENOCARCINOMA OF COLON METASTATIC TO LIVER (H): Primary | ICD-10-CM

## 2021-04-19 PROCEDURE — G0463 HOSPITAL OUTPT CLINIC VISIT: HCPCS | Mod: 25

## 2021-04-19 PROCEDURE — 74182 MRI ABDOMEN W/CONTRAST: CPT | Mod: MG

## 2021-04-19 PROCEDURE — 99024 POSTOP FOLLOW-UP VISIT: CPT | Performed by: SURGERY

## 2021-04-19 PROCEDURE — G0463 HOSPITAL OUTPT CLINIC VISIT: HCPCS

## 2021-04-19 PROCEDURE — A9581 GADOXETATE DISODIUM INJ: HCPCS | Performed by: SURGERY

## 2021-04-19 PROCEDURE — 74182 MRI ABDOMEN W/CONTRAST: CPT | Mod: 26 | Performed by: RADIOLOGY

## 2021-04-19 PROCEDURE — G1004 CDSM NDSC: HCPCS | Mod: GC | Performed by: RADIOLOGY

## 2021-04-19 PROCEDURE — 255N000002 HC RX 255 OP 636: Performed by: SURGERY

## 2021-04-19 RX ADMIN — GADOXETATE DISODIUM 10 ML: 181.43 INJECTION, SOLUTION INTRAVENOUS at 07:15

## 2021-04-19 ASSESSMENT — PAIN SCALES - GENERAL: PAINLEVEL: NO PAIN (0)

## 2021-04-19 NOTE — NURSING NOTE
"Oncology Rooming Note    April 19, 2021 10:47 AM   Hernandez Aragon is a 70 year old male who presents for:    Chief Complaint   Patient presents with     Oncology Clinic Visit     adenocarcinoma of colon metastatic to liver     Initial Vitals: BP (!) 163/90   Pulse 72   Temp 98.4  F (36.9  C) (Oral)   Wt 103.6 kg (228 lb 8 oz)   SpO2 98%   BMI 27.81 kg/m   Estimated body mass index is 27.81 kg/m  as calculated from the following:    Height as of 4/6/21: 1.93 m (6' 4\").    Weight as of this encounter: 103.6 kg (228 lb 8 oz). Body surface area is 2.36 meters squared.  No Pain (0) Comment: Data Unavailable   No LMP for male patient.  Allergies reviewed: Yes  Medications reviewed: Yes    Medications: Medication refills not needed today.  Pharmacy name entered into EPIC: [facility] - [address]    Clinical concerns: none       Kimberley Raman CMA            "

## 2021-04-19 NOTE — LETTER
4/19/2021         RE: Hernandez Aragon  [address]  [place] [address]        Dear Colleague,    Thank you for referring your patient, Hernandez Aragon, to the [facility]. Please see a copy of my visit note below.    Mr. Aragon is status post laparoscopic microwave ablation of a fairly large metastatic lesion from colorectal cancer.  He tolerated his procedure well and has done well since then.  He has no complaints today.      I reviewed his MRI with Mr. Aragon and his wife today which shows an excellent ablation with no evidence of residual or recurrent tumor in the liver.  I discussed the findings with him.  I suggested that he return to his medical oncologist for ongoing cares.      During our discussion, Mr. Aragon and his wife requested to be seen here at the [facility] for an opinion regarding ongoing oncology cares.  Based on that request, we will put in a referral from Medical Oncology today.      I will plan to see Mr. Aragon on an as-needed basis moving forward.  From a surgical standpoint, he looks like he is doing great.           Again, thank you for allowing me to participate in the care of your patient.        Sincerely,        Nikolay Chahal MD

## 2021-04-19 NOTE — PROGRESS NOTES
Mr. Aragon is status post laparoscopic microwave ablation of a fairly large metastatic lesion from colorectal cancer.  He tolerated his procedure well and has done well since then.  He has no complaints today.      I reviewed his MRI with Mr. Aragon and his wife today which shows an excellent ablation with no evidence of residual or recurrent tumor in the liver.  I discussed the findings with him.  I suggested that he return to his medical oncologist for ongoing cares.      During our discussion, Mr. Aragon and his wife requested to be seen here at the [facility] for an opinion regarding ongoing oncology cares.  Based on that request, we will put in a referral from Medical Oncology today.      I will plan to see Mr. Aragon on an as-needed basis moving forward.  From a surgical standpoint, he looks like he is doing great.

## 2021-04-26 NOTE — TELEPHONE ENCOUNTER
RECORDS STATUS - ALL OTHER DIAGNOSIS      RECORDS RECEIVED FROM: [facility], [facility], [facility] (outside img previously resolved) slides requested, detailed below.   DATE RECEIVED:    NOTES STATUS DETAILS   OFFICE NOTE from referring provider Epic Dr. Nikolay Chahal    [facility] sent to [facility]    OFFICE NOTE from medical oncologist     DISCHARGE SUMMARY from hospital [facility]    DISCHARGE REPORT from the ER     OPERATIVE REPORT [facility] Epic:  21: Ablation    [facility]:  3/20/15: Port-a-Cath removal  14: Open Hepatic Resection  10/29/13: Right Colectomy   MEDICATION LIST [facility]    CLINICAL TRIAL TREATMENTS TO DATE     LABS     PATHOLOGY REPORTS [facility], Reports in , Slides requested     [employer] ([facility]): [id number]    [employer] ([facility]):  [employer] Track[employer] [facility]:  1/15/21  7/6/20  6/5/19  4/9/15: [id number]  3/20/15: [id number]  14: [id number]  10/29/13: [id number]    [facility]     ANYTHING RELATED TO DIAGNOSIS Epic 21   GENONOMIC TESTING     TYPE:     IMAGING (NEED IMAGES & REPORT)     [facility]   MRI     MAMMO     ULTRASOUND PACS [facility]   PET PACS [facility]

## 2021-04-29 PROCEDURE — 88321 CONSLTJ&REPRT SLD PREP ELSWR: CPT | Performed by: PATHOLOGY

## 2021-04-29 PROCEDURE — 999N001032 HC STATISTIC REVIEW OUTSIDE SLIDES TC 88321: Performed by: INTERNAL MEDICINE

## 2021-04-30 LAB — COPATH REPORT: NORMAL

## 2021-04-30 PROCEDURE — 88321 CONSLTJ&REPRT SLD PREP ELSWR: CPT | Mod: 26 | Performed by: PATHOLOGY

## 2021-04-30 PROCEDURE — 999N001032 HC STATISTIC REVIEW OUTSIDE SLIDES TC 88321: Performed by: INTERNAL MEDICINE

## 2021-05-01 ENCOUNTER — HEALTH MAINTENANCE LETTER (OUTPATIENT)
Age: 71
End: 2021-05-01

## 2021-05-10 LAB — COPATH REPORT: NORMAL

## 2021-05-20 ENCOUNTER — VIRTUAL VISIT (OUTPATIENT)
Dept: ONCOLOGY | Facility: CLINIC | Age: 71
End: 2021-05-20
Attending: SURGERY
Payer: COMMERCIAL

## 2021-05-20 ENCOUNTER — PRE VISIT (OUTPATIENT)
Dept: ONCOLOGY | Facility: CLINIC | Age: 71
End: 2021-05-20

## 2021-05-20 DIAGNOSIS — C78.7 ADENOCARCINOMA OF COLON METASTATIC TO LIVER (H): ICD-10-CM

## 2021-05-20 DIAGNOSIS — C18.9 ADENOCARCINOMA OF COLON METASTATIC TO LIVER (H): ICD-10-CM

## 2021-05-20 PROCEDURE — 99205 OFFICE O/P NEW HI 60 MIN: CPT | Mod: 95 | Performed by: INTERNAL MEDICINE

## 2021-05-20 PROCEDURE — 999N001193 HC VIDEO/TELEPHONE VISIT; NO CHARGE

## 2021-05-20 NOTE — LETTER
5/20/2021         RE: Hernandez Aragon  [address]  [place] [address]        Dear Colleague,    Thank you for referring your patient, Hernandez Aragon, to the [facility]. Please see a copy of my visit note below.    Oncology initial visit:  Date on this visit: 5/20/2021    Hernandez Aragon  is referred by Dr.Eric Mychal Chahal for an oncology consultation. He requires evaluation for metastatic colon cancer.    Primary Physician: Michael Nichols     History Of Present Illness:  Mr. Aragon is a 70 year old male who presents with metastatic colon cancer.  I have reviewed notes from Dr. Chahal as well as outside oncologist Dr. Hawthorne and Dr Bouchra Hanks from [facility].    I have copied and updated from prior notes and his oncologic history is summarized below.  In October 2013 he had a screening colonoscopy which showed a 1.5 cm well-differentiated adenocarcinoma at the base of large tubulovillous adenoma which invaded the muscularis propria.  There was no evidence of metastatic disease.    On 10/29/2013 he underwent laparoscopic hand-assisted right hemicolectomy.  All 16 lymph nodes were negative.  It was classified as stage I pT1pN0 lesion.    5/20/2014.  He was noted to have metastatic adenocarcinoma to the liver which was biopsy-proven.    He then had FOLFOX with Panitumumab into 3 cycles followed by resection of the liver metastasis in September 2014.    He completed 9 additional cycles and the last one was on 2/4/2015.  Dose of oxaliplatin was decreased by 50% due to neuropathy.    In the fall 2016 he was noted to have left lung lower lobe meta stasis which was resected on 10/6/2016. No chemo was given perioperatively.      5/21/2020.  CT scan showed a lesion in the dome of the liver measuring 4.2 cm as well as a 2.1 cm subcapsular lesion in hepatic segment 7/8 which is indeterminate.    7/6/2020.  CT-guided liver biopsy confirmed metastatic moderately differentiated colon  adenocarcinoma.  MSI intact.  K-lana mutated.  CEA was 10.3.    He was started on FOLFIRI plus Avastin on 8/18/2020 and he completed 6 cycles on 10/27/2020.  Neulasta was given due to neutropenia.   11/13/2020.  Showed mild improvement in the liver lesions.    As the response was not very prominent, his chemotherapy was changed to FOLFOX plus Avastin plus he was tolerating FOLFIRI plus Avastin poorly. He was noticing mouth sores, nose bleeds, hemorrhoids, general fatigue.       2/11/2021.  After completing 3 cycles of FOLFOX/Avastin, MRI of the liver showed decrease in size of the lesion in the dome of the liver.  CT chest abdomen pelvis showed that the largest lesion measures 29 x 35 mm while previously it was 33 x 38 mm in November 2020.  Other liver lesions were stable.  Lung nodules were stable.    He received cycle #4 on 2/16/2021 FOLFOX.  Avastin was stopped.  He tolerated chemo well.       4/6/2021.  He underwent diagnostic laparoscopy and extensive lysis of adhesions and ultrasound-guided percutaneous microwave ablation of the liver tumor in segment 8/4A.  It was about 3.5 cm in size.    4/19/2021.  Follow-up MRI of the abdomen showed post microwave ablation changes along the segment 4A/8 with  associated inherent T1 hyperintense signal, likely representing coagulative necrosis. No definite evidence of residual/recurrent tumor. New bland thrombus of the distal portal vein branch of the  segment 8, likely treatment-related.  Stable in size of the subcapsular inherent T1 hyperintense, mild T2 hyperintense foci without definite enhancement in the segment 7/8  measures 2 cm and segment 6 measures 2.3 cm, compatible with previously treated metastatic lesions. No new hepatic lesion.   Unchanged several cystic foci in the pancreas since prior MRI, largest is measuring 1.5 cm in the pancreatic tail. No associated worrisome imaging features. 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    He feels fine. He feels tingling in toes and little bit in fingertips. Its not very bothersome.    ECOG 0    ROS:  A comprehensive ROS was otherwise neg      Past Medical/Surgical History:  Past Medical History:   Diagnosis Date     HTN (hypertension)      Thrombocytopenia (H)    BPH   Past Surgical History:   Procedure Laterality Date     COLONOSCOPY       HEMICOLECTOMY, RT/LT       LAPAROSCOPIC ABLATION LIVER TUMOR N/A 4/6/2021    Procedure: Laparoscopic ultrasound guided microwave ablation of liver tumor, lysis of adhesions;  Surgeon: Nikolay Chahal MD;  Location: [facility] OR     LAPAROSCOPIC HEPATECTOMY PARTIAL N/A 4/6/2021    Procedure: Laparoscopic partial hepatectomy;  Surgeon: Nikolay Chahal MD;  Location: [facility] OR     LOBECTOMY LUNG      left lower lobe     Cancer History:   As above    Allergies:  Allergies as of 05/20/2021     (No Known Allergies)     Current Medications:  Current Outpatient Medications   Medication Sig Dispense Refill     alum & mag hydroxide-simethicone 80 mL, diphenhydrAMINE 80 mL, lidocaine 80 mL magic mouthwash Take 5 mLs by mouth as needed       bisacodyl (DULCOLAX) 5 MG EC tablet Take 5 mg by mouth as needed        metoprolol succinate ER (TOPROL-XL) 50 MG 24 hr tablet Take 50 mg by mouth At Bedtime        Multiple Vitamins-Minerals (VITRUM SENIOR) TABS Take 2 tablets by mouth every morning        oxyCODONE (ROXICODONE) 5 MG tablet Take 1-2 tablets (5-10 mg) by mouth every 4 hours as needed for moderate to severe pain 30 tablet 0     tamsulosin (FLOMAX) 0.4 MG capsule Take 0.4 mg by mouth At Bedtime        Family History:  Family History   Problem Relation Age of Onset     Anesthesia Reaction No family hx of      Sister with cervical cancer    Social History:  Social History     Socioeconomic History     Marital status:      Spouse name: Not on file     Number of children: Not on file     Years of education: Not on file     Highest education level: Not on  file   Occupational History     Not on file   Social Needs     Financial resource strain: Not on file     Food insecurity     Worry: Not on file     Inability: Not on file     Transportation needs     Medical: Not on file     Non-medical: Not on file   Tobacco Use     Smoking status: Former Smoker     Smokeless tobacco: Never Used     Tobacco comment: distant history   Substance and Sexual Activity     Alcohol use: Not on file     Comment: infrequent     Drug use: Not Currently     Sexual activity: Not on file   Lifestyle     Physical activity     Days per week: Not on file     Minutes per session: Not on file     Stress: Not on file   Relationships     Social connections     Talks on phone: Not on file     Gets together: Not on file     Attends Rastafarian service: Not on file     Active member of club or organization: Not on file     Attends meetings of clubs or organizations: Not on file     Relationship status: Not on file     Intimate partner violence     Fear of current or ex partner: Not on file     Emotionally abused: Not on file     Physically abused: Not on file     Forced sexual activity: Not on file   Other Topics Concern     Not on file   Social History Narrative     Not on file     Quit smoking 50 years ago. Drinks occasional etoh. Lives with wife.      Physical Exam:  There were no vitals taken for this visit.   Wt Readings from Last 4 Encounters:   04/19/21 103.6 kg (228 lb 8 oz)   04/06/21 103.7 kg (228 lb 9.9 oz)       Constitutional.  Does not seem to be in any acute distress.  Eyes.  No redness or discharge noted.  Respiratory.  Speaking in full sentences.  Breathing seems comfortable without any accessory use of muscles.    Skin.  Visualized his skin does not show any obvious rashes.  Musculoskeletal.  Range of motion for visualized areas is intact.  Neurological.  Alert and oriented x3.  Psychiatric.  Mood, mentation and affect are normal.  Decision making capacity is intact.      The rest of a  comprehensive physical examination is deferred due to [facility] video visit restrictions.    Laboratory/Imaging Studies      Reviewed    4/6/2021.  Hemoglobin 14.5.  Platelets 108.  Chemistry showed normal creatinine and potassium.    Imaging and pathology as described above.    ASSESSMENT/PLAN:    He has metastatic colon cancer to the lungs and the liver.  MSI intact.  K-lana mutated.  He had left lower lobectomy in 2016 and had liver directed therapies including metastatectomy in September 2014 and then again had microwave ablation on 4/6/2021 after FOLFOX/Avastin chemotherapy.  Last chemotherapy was in mid February 2021.    Last MRI on 4/19/2021 shows expected posttreatment changes without evidence of new disease.  He continues to feel well.  Since he has received 10 cycles of chemotherapy which is 6 cycles of FOLFIRI Avastin and 3 cycles of FOLFOX Avastin and 1 cycle of FOLFOX perioperatively, I would recommend close observation for now.  I would recommend repeating CT chest abdomen and pelvis and labs including CEA in a couple of months.  For now I would plan to do scans and labs every 3 months even if he continues to do well.    We discussed that generally stage IV colon cancer is not considered curable except if there is is oligometastatic disease and we are able to take care of the isolated metastatic disease.  There is a chance of cure for him albeit it is small.  Only time will tell us whether he is cured or not.  He understands that.    Colon polyps.  Last colonoscopy was in January 2021.  He should have a repeat colonoscopy 1 year from then which would be January 2022.    Neuropathy.  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  I recommend considering acupuncture or laser therapy.    Thrombocytopenia.  Related to chemotherapy.  Repeat labs in a couple of months.    Neutropenia.  Previously he had chemotherapy induced neutropenia requiring Neulasta support.    Port.  He has port in.   "He will get port flushed in [place] every 8 weeks.    I would like to see him back in a couple of months with repeat labs and CT scan prior.  He will call me in the interim if he has any questions or concerns.  At this time he tells me that he will be following with me at least till September and potentially even beyond that if the [facility].    I answered all of his and his wife's questions to their satisfaction.  They are agreeable and comfortable with the plan.    Bibi Howe MD         Hernandez is a 70 year old who is being evaluated via a billable video visit.      How would you like to obtain your AVS? MyChart     If the video visit is dropped, the invitation should be resent by: Text to cell phone: [phone]     Will anyone else be joining your video visit? No          Vitals - Patient Reported  Weight (Patient Reported): 103 kg (227 lb)  Height (Patient Reported): 193 cm (6' 3.98\")  BMI (Based on Pt Reported Ht/Wt): 27.64  Pain Score: No Pain (0)    Clark Roman LPN    Video Start Time: 10:59 AM  Video-Visit Details    Type of service:  Video Visit    Video End Time:11:23 AM    Originating Location (pt. Location): Home    Distant Location (provider location):  [facility] [place] [facility]     Platform used for Video Visit: Hybrent      Again, thank you for allowing me to participate in the care of your patient.        Sincerely,        Bibi Howe MD    "

## 2021-05-20 NOTE — PROGRESS NOTES
"Hernandez is a 70 year old who is being evaluated via a billable video visit.      How would you like to obtain your AVS? MyChart     If the video visit is dropped, the invitation should be resent by: Text to cell phone: [phone]     Will anyone else be joining your video visit? No          Vitals - Patient Reported  Weight (Patient Reported): 103 kg (227 lb)  Height (Patient Reported): 193 cm (6' 3.98\")  BMI (Based on Pt Reported Ht/Wt): 27.64  Pain Score: No Pain (0)    Clark Roman LPN    Video Start Time: 10:59 AM  Video-Visit Details    Type of service:  Video Visit    Video End Time:11:23 AM    Originating Location (pt. Location): Home    Distant Location (provider location):  [facility]     Platform used for Video Visit: Guerda  "

## 2021-05-20 NOTE — PROGRESS NOTES
Oncology initial visit:  Date on this visit: 5/20/2021    Hernandez Aragon  is referred by Dr.Eric Mychal Chahal for an oncology consultation. He requires evaluation for metastatic colon cancer.    Primary Physician: Michael Nichols     History Of Present Illness:  Mr. Aragon is a 70 year old male who presents with metastatic colon cancer.  I have reviewed notes from Dr. Chahal as well as outside oncologist Dr. Hawthorne and Dr Bouchra Hanks from [facility].    I have copied and updated from prior notes and his oncologic history is summarized below.  In October 2013 he had a screening colonoscopy which showed a 1.5 cm well-differentiated adenocarcinoma at the base of large tubulovillous adenoma which invaded the muscularis propria.  There was no evidence of metastatic disease.    On 10/29/2013 he underwent laparoscopic hand-assisted right hemicolectomy.  All 16 lymph nodes were negative.  It was classified as stage I pT1pN0 lesion.    5/20/2014.  He was noted to have metastatic adenocarcinoma to the liver which was biopsy-proven.    He then had FOLFOX with Panitumumab into 3 cycles followed by resection of the liver metastasis in September 2014.    He completed 9 additional cycles and the last one was on 2/4/2015.  Dose of oxaliplatin was decreased by 50% due to neuropathy.    In the fall 2016 he was noted to have left lung lower lobe meta stasis which was resected on 10/6/2016. No chemo was given perioperatively.      5/21/2020.  CT scan showed a lesion in the dome of the liver measuring 4.2 cm as well as a 2.1 cm subcapsular lesion in hepatic segment 7/8 which is indeterminate.    7/6/2020.  CT-guided liver biopsy confirmed metastatic moderately differentiated colon adenocarcinoma.  MSI intact.  K-lana mutated.  CEA was 10.3.    He was started on FOLFIRI plus Avastin on 8/18/2020 and he completed 6 cycles on 10/27/2020.  Neulasta was given due to neutropenia.   11/13/2020.  Showed mild improvement in the liver  lesions.    As the response was not very prominent, his chemotherapy was changed to FOLFOX plus Avastin plus he was tolerating FOLFIRI plus Avastin poorly. He was noticing mouth sores, nose bleeds, hemorrhoids, general fatigue.       2/11/2021.  After completing 3 cycles of FOLFOX/Avastin, MRI of the liver showed decrease in size of the lesion in the dome of the liver.  CT chest abdomen pelvis showed that the largest lesion measures 29 x 35 mm while previously it was 33 x 38 mm in November 2020.  Other liver lesions were stable.  Lung nodules were stable.    He received cycle #4 on 2/16/2021 FOLFOX.  Avastin was stopped.  He tolerated chemo well.       4/6/2021.  He underwent diagnostic laparoscopy and extensive lysis of adhesions and ultrasound-guided percutaneous microwave ablation of the liver tumor in segment 8/4A.  It was about 3.5 cm in size.    4/19/2021.  Follow-up MRI of the abdomen showed post microwave ablation changes along the segment 4A/8 with  associated inherent T1 hyperintense signal, likely representing coagulative necrosis. No definite evidence of residual/recurrent tumor. New bland thrombus of the distal portal vein branch of the  segment 8, likely treatment-related.  Stable in size of the subcapsular inherent T1 hyperintense, mild T2 hyperintense foci without definite enhancement in the segment 7/8  measures 2 cm and segment 6 measures 2.3 cm, compatible with previously treated metastatic lesions. No new hepatic lesion.   Unchanged several cystic foci in the pancreas since prior MRI, largest is measuring 1.5 cm in the pancreatic tail. No associated worrisome imaging features. 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    He feels fine. He feels tingling in toes and little bit in fingertips. Its not very bothersome.    ECOG 0    ROS:  A comprehensive ROS was otherwise neg      Past Medical/Surgical History:  Past Medical History:   Diagnosis Date      HTN (hypertension)      Thrombocytopenia (H)    BPH   Past Surgical History:   Procedure Laterality Date     COLONOSCOPY       HEMICOLECTOMY, RT/LT       LAPAROSCOPIC ABLATION LIVER TUMOR N/A 4/6/2021    Procedure: Laparoscopic ultrasound guided microwave ablation of liver tumor, lysis of adhesions;  Surgeon: Nikolay Chahal MD;  Location: [facility] OR     LAPAROSCOPIC HEPATECTOMY PARTIAL N/A 4/6/2021    Procedure: Laparoscopic partial hepatectomy;  Surgeon: Nikolay Chahal MD;  Location: [facility] OR     LOBECTOMY LUNG      left lower lobe     Cancer History:   As above    Allergies:  Allergies as of 05/20/2021     (No Known Allergies)     Current Medications:  Current Outpatient Medications   Medication Sig Dispense Refill     alum & mag hydroxide-simethicone 80 mL, diphenhydrAMINE 80 mL, lidocaine 80 mL magic mouthwash Take 5 mLs by mouth as needed       bisacodyl (DULCOLAX) 5 MG EC tablet Take 5 mg by mouth as needed        metoprolol succinate ER (TOPROL-XL) 50 MG 24 hr tablet Take 50 mg by mouth At Bedtime        Multiple Vitamins-Minerals (VITRUM SENIOR) TABS Take 2 tablets by mouth every morning        oxyCODONE (ROXICODONE) 5 MG tablet Take 1-2 tablets (5-10 mg) by mouth every 4 hours as needed for moderate to severe pain 30 tablet 0     tamsulosin (FLOMAX) 0.4 MG capsule Take 0.4 mg by mouth At Bedtime        Family History:  Family History   Problem Relation Age of Onset     Anesthesia Reaction No family hx of      Sister with cervical cancer    Social History:  Social History     Socioeconomic History     Marital status:      Spouse name: Not on file     Number of children: Not on file     Years of education: Not on file     Highest education level: Not on file   Occupational History     Not on file   Social Needs     Financial resource strain: Not on file     Food insecurity     Worry: Not on file     Inability: Not on file     Transportation needs     Medical: Not on file     Non-medical: Not on file    Tobacco Use     Smoking status: Former Smoker     Smokeless tobacco: Never Used     Tobacco comment: distant history   Substance and Sexual Activity     Alcohol use: Not on file     Comment: infrequent     Drug use: Not Currently     Sexual activity: Not on file   Lifestyle     Physical activity     Days per week: Not on file     Minutes per session: Not on file     Stress: Not on file   Relationships     Social connections     Talks on phone: Not on file     Gets together: Not on file     Attends Sabianism service: Not on file     Active member of club or organization: Not on file     Attends meetings of clubs or organizations: Not on file     Relationship status: Not on file     Intimate partner violence     Fear of current or ex partner: Not on file     Emotionally abused: Not on file     Physically abused: Not on file     Forced sexual activity: Not on file   Other Topics Concern     Not on file   Social History Narrative     Not on file     Quit smoking 50 years ago. Drinks occasional etoh. Lives with wife.      Physical Exam:  There were no vitals taken for this visit.   Wt Readings from Last 4 Encounters:   04/19/21 103.6 kg (228 lb 8 oz)   04/06/21 103.7 kg (228 lb 9.9 oz)       Constitutional.  Does not seem to be in any acute distress.  Eyes.  No redness or discharge noted.  Respiratory.  Speaking in full sentences.  Breathing seems comfortable without any accessory use of muscles.    Skin.  Visualized his skin does not show any obvious rashes.  Musculoskeletal.  Range of motion for visualized areas is intact.  Neurological.  Alert and oriented x3.  Psychiatric.  Mood, mentation and affect are normal.  Decision making capacity is intact.      The rest of a comprehensive physical examination is deferred due to Public Health Emergency video visit restrictions.    Laboratory/Imaging Studies      Reviewed    4/6/2021.  Hemoglobin 14.5.  Platelets 108.  Chemistry showed normal creatinine and  potassium.    Imaging and pathology as described above.    ASSESSMENT/PLAN:    He has metastatic colon cancer to the lungs and the liver.  MSI intact.  K-lana mutated.  He had left lower lobectomy in 2016 and had liver directed therapies including metastatectomy in September 2014 and then again had microwave ablation on 4/6/2021 after FOLFOX/Avastin chemotherapy.  Last chemotherapy was in mid February 2021.    Last MRI on 4/19/2021 shows expected posttreatment changes without evidence of new disease.  He continues to feel well.  Since he has received 10 cycles of chemotherapy which is 6 cycles of FOLFIRI Avastin and 3 cycles of FOLFOX Avastin and 1 cycle of FOLFOX perioperatively, I would recommend close observation for now.  I would recommend repeating CT chest abdomen and pelvis and labs including CEA in a couple of months.  For now I would plan to do scans and labs every 3 months even if he continues to do well.    We discussed that generally stage IV colon cancer is not considered curable except if there is is oligometastatic disease and we are able to take care of the isolated metastatic disease.  There is a chance of cure for him albeit it is small.  Only time will tell us whether he is cured or not.  He understands that.    Colon polyps.  Last colonoscopy was in January 2021.  He should have a repeat colonoscopy 1 year from then which would be January 2022.    Neuropathy.  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  I recommend considering acupuncture or laser therapy.    Thrombocytopenia.  Related to chemotherapy.  Repeat labs in a couple of months.    Neutropenia.  Previously he had chemotherapy induced neutropenia requiring Neulasta support.    Port.  He has port in.  He will get port flushed in [place] every 8 weeks.    I would like to see him back in a couple of months with repeat labs and CT scan prior.  He will call me in the interim if he has any questions or concerns.  At this time he tells  me that he will be following with me at least till September and potentially even beyond that if the [facility] permits.    I answered all of his and his wife's questions to their satisfaction.  They are agreeable and comfortable with the plan.    Bibi Howe MD

## 2021-07-26 ENCOUNTER — ANCILLARY PROCEDURE (OUTPATIENT)
Dept: CT IMAGING | Facility: CLINIC | Age: 71
End: 2021-07-26
Attending: INTERNAL MEDICINE
Payer: COMMERCIAL

## 2021-07-26 ENCOUNTER — LAB (OUTPATIENT)
Dept: LAB | Facility: CLINIC | Age: 71
End: 2021-07-26
Payer: COMMERCIAL

## 2021-07-26 DIAGNOSIS — C18.9 ADENOCARCINOMA OF COLON METASTATIC TO LIVER (H): ICD-10-CM

## 2021-07-26 DIAGNOSIS — C78.7 ADENOCARCINOMA OF COLON METASTATIC TO LIVER (H): ICD-10-CM

## 2021-07-26 LAB
ALBUMIN SERPL-MCNC: 3.9 G/DL (ref 3.4–5)
ALP SERPL-CCNC: 86 U/L (ref 40–150)
ALT SERPL W P-5'-P-CCNC: 34 U/L (ref 0–70)
ANION GAP SERPL CALCULATED.3IONS-SCNC: 3 MMOL/L (ref 3–14)
AST SERPL W P-5'-P-CCNC: 32 U/L (ref 0–45)
BASOPHILS # BLD AUTO: 0 10E3/UL (ref 0–0.2)
BASOPHILS NFR BLD AUTO: 0 %
BILIRUB SERPL-MCNC: 0.7 MG/DL (ref 0.2–1.3)
BUN SERPL-MCNC: 26 MG/DL (ref 7–30)
CALCIUM SERPL-MCNC: 8.7 MG/DL (ref 8.5–10.1)
CEA SERPL-MCNC: <0.5 UG/L (ref 0–2.5)
CHLORIDE BLD-SCNC: 106 MMOL/L (ref 94–109)
CO2 SERPL-SCNC: 28 MMOL/L (ref 20–32)
CREAT BLD-MCNC: 1.1 MG/DL (ref 0.7–1.3)
CREAT SERPL-MCNC: 1.13 MG/DL (ref 0.66–1.25)
EOSINOPHIL # BLD AUTO: 0.1 10E3/UL (ref 0–0.7)
EOSINOPHIL NFR BLD AUTO: 1 %
ERYTHROCYTE [DISTWIDTH] IN BLOOD BY AUTOMATED COUNT: 13.4 % (ref 10–15)
GFR SERPL CREATININE-BSD FRML MDRD: 64 ML/MIN/1.73M2
GFR SERPL CREATININE-BSD FRML MDRD: >60 ML/MIN/1.73M2
GLUCOSE BLD-MCNC: 104 MG/DL (ref 70–99)
HCT VFR BLD AUTO: 45.6 % (ref 40–53)
HGB BLD-MCNC: 15.4 G/DL (ref 13.3–17.7)
IMM GRANULOCYTES # BLD: 0 10E3/UL
IMM GRANULOCYTES NFR BLD: 0 %
LYMPHOCYTES # BLD AUTO: 1.1 10E3/UL (ref 0.8–5.3)
LYMPHOCYTES NFR BLD AUTO: 22 %
MCH RBC QN AUTO: 31.4 PG (ref 26.5–33)
MCHC RBC AUTO-ENTMCNC: 33.8 G/DL (ref 31.5–36.5)
MCV RBC AUTO: 93 FL (ref 78–100)
MONOCYTES # BLD AUTO: 0.4 10E3/UL (ref 0–1.3)
MONOCYTES NFR BLD AUTO: 7 %
NEUTROPHILS # BLD AUTO: 3.3 10E3/UL (ref 1.6–8.3)
NEUTROPHILS NFR BLD AUTO: 70 %
NRBC # BLD AUTO: 0 10E3/UL
NRBC BLD AUTO-RTO: 0 /100
PLATELET # BLD AUTO: 119 10E3/UL (ref 150–450)
POTASSIUM BLD-SCNC: 4.2 MMOL/L (ref 3.4–5.3)
PROT SERPL-MCNC: 6.8 G/DL (ref 6.8–8.8)
RBC # BLD AUTO: 4.91 10E6/UL (ref 4.4–5.9)
SODIUM SERPL-SCNC: 137 MMOL/L (ref 133–144)
WBC # BLD AUTO: 4.7 10E3/UL (ref 4–11)

## 2021-07-26 PROCEDURE — 85025 COMPLETE CBC W/AUTO DIFF WBC: CPT | Performed by: PATHOLOGY

## 2021-07-26 PROCEDURE — 71260 CT THORAX DX C+: CPT | Performed by: RADIOLOGY

## 2021-07-26 PROCEDURE — 36415 COLL VENOUS BLD VENIPUNCTURE: CPT | Performed by: PATHOLOGY

## 2021-07-26 PROCEDURE — 74177 CT ABD & PELVIS W/CONTRAST: CPT | Performed by: RADIOLOGY

## 2021-07-26 PROCEDURE — 80053 COMPREHEN METABOLIC PANEL: CPT | Performed by: PATHOLOGY

## 2021-07-26 PROCEDURE — 82565 ASSAY OF CREATININE: CPT | Performed by: PATHOLOGY

## 2021-07-26 PROCEDURE — 82378 CARCINOEMBRYONIC ANTIGEN: CPT | Performed by: INTERNAL MEDICINE

## 2021-07-26 RX ORDER — IOPAMIDOL 755 MG/ML
135 INJECTION, SOLUTION INTRAVASCULAR ONCE
Status: COMPLETED | OUTPATIENT
Start: 2021-07-26 | End: 2021-07-26

## 2021-07-26 RX ADMIN — IOPAMIDOL 135 ML: 755 INJECTION, SOLUTION INTRAVASCULAR at 09:32

## 2021-07-29 ENCOUNTER — VIRTUAL VISIT (OUTPATIENT)
Dept: ONCOLOGY | Facility: CLINIC | Age: 71
End: 2021-07-29
Attending: INTERNAL MEDICINE
Payer: COMMERCIAL

## 2021-07-29 DIAGNOSIS — R91.8 PULMONARY NODULES: ICD-10-CM

## 2021-07-29 DIAGNOSIS — C18.9 ADENOCARCINOMA OF COLON METASTATIC TO LIVER (H): Primary | ICD-10-CM

## 2021-07-29 DIAGNOSIS — C78.7 ADENOCARCINOMA OF COLON METASTATIC TO LIVER (H): Primary | ICD-10-CM

## 2021-07-29 PROCEDURE — 99213 OFFICE O/P EST LOW 20 MIN: CPT | Mod: 95 | Performed by: INTERNAL MEDICINE

## 2021-07-29 NOTE — PROGRESS NOTES
Oncology follow up visit:  Date on this visit: 7/29/21     Hernandez Aragon  is referred by Dr.Eric Mychal Chahal for an oncology consultation. He requires evaluation for metastatic colon cancer.    Primary Physician: Michael Nichols     History Of Present Illness:  See my previous note for details.  I have copied and updated from prior note.      Mr. Aragon is a 70 year old male who presents with metastatic colon cancer.  I have reviewed notes from Dr. Chahal as well as outside oncologist Dr. Hawthorne and Dr Bouchra Hanks from [facility].    His oncologic history is summarized below.  In October 2013 he had a screening colonoscopy which showed a 1.5 cm well-differentiated adenocarcinoma at the base of large tubulovillous adenoma which invaded the muscularis propria.  There was no evidence of metastatic disease.    On 10/29/2013 he underwent laparoscopic hand-assisted right hemicolectomy.  All 16 lymph nodes were negative.  It was classified as stage I pT1pN0 lesion.    5/20/2014.  He was noted to have metastatic adenocarcinoma to the liver which was biopsy-proven.    He then had FOLFOX with Panitumumab x 3 cycles followed by resection of the liver metastasis in September 2014.    He completed 9 additional cycles and the last one was on 2/4/2015.  Dose of oxaliplatin was decreased by 50% due to neuropathy.    In the fall 2016 he was noted to have left lung lower lobe meta stasis which was resected on 10/6/2016. No chemo was given perioperatively.      5/21/2020.  CT scan showed a lesion in the dome of the liver measuring 4.2 cm as well as a 2.1 cm subcapsular lesion in hepatic segment 7/8 which is indeterminate.    7/6/2020.  CT-guided liver biopsy confirmed metastatic moderately differentiated colon adenocarcinoma.  MSI intact.  K-lana mutated.  CEA was 10.3.    He was started on FOLFIRI plus Avastin on 8/18/2020 and he completed 6 cycles on 10/27/2020.  Neulasta was given due to neutropenia.   11/13/2020.  Showed mild  improvement in the liver lesions.    As the response was not very prominent, his chemotherapy was changed to FOLFOX plus Avastin plus he was tolerating FOLFIRI plus Avastin poorly. He was noticing mouth sores, nose bleeds, hemorrhoids, general fatigue.       2/11/2021.  After completing 3 cycles of FOLFOX/Avastin, MRI of the liver showed decrease in size of the lesion in the dome of the liver.  CT chest abdomen pelvis showed that the largest lesion measures 29 x 35 mm while previously it was 33 x 38 mm in November 2020.  Other liver lesions were stable.  Lung nodules were stable.    He received cycle #4 on 2/16/2021 FOLFOX.  Avastin was stopped.  He tolerated chemo well.       4/6/2021.  He underwent diagnostic laparoscopy and extensive lysis of adhesions and ultrasound-guided percutaneous microwave ablation of the liver tumor in segment 8/4A.  It was about 3.5 cm in size.    4/19/2021.  Follow-up MRI of the abdomen showed post microwave ablation changes along the segment 4A/8 with  associated inherent T1 hyperintense signal, likely representing coagulative necrosis. No definite evidence of residual/recurrent tumor. New bland thrombus of the distal portal vein branch of the  segment 8, likely treatment-related.  Stable in size of the subcapsular inherent T1 hyperintense, mild T2 hyperintense foci without definite enhancement in the segment 7/8  measures 2 cm and segment 6 measures 2.3 cm, compatible with previously treated metastatic lesions. No new hepatic lesion.   Unchanged several cystic foci in the pancreas since prior MRI, largest is measuring 1.5 cm in the pancreatic tail. No associated worrisome imaging features. 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    Since he had received 10 cycles of chemotherapy which is 6 cycles of FOLFIRI Avastin and 3 cycles of FOLFOX Avastin and 1 cycle of FOLFOX perioperatively, we decided on close observation.        Interval  history.  This is a video visit.  He is doing well. On 7/26/2021 his port was unable to be accessed but they were not able to draw blood. They were able to flush it though.  Neuropathy is mild. He notices some tenderness on bare feet. Otherwise does not notice it. Hands have occasional tingling in the tips.  No abdominal pain or GI symptoms. No weight loss. Energy is very good. No respiratory complaints.      ECOG 0    ROS:  Rest of the comprehensive review of the system was otherwise unremarkable.      Past Medical/Surgical History:  Past Medical History:   Diagnosis Date     HTN (hypertension)      Thrombocytopenia (H)    BPH   Past Surgical History:   Procedure Laterality Date     COLONOSCOPY       HEMICOLECTOMY, RT/LT       LAPAROSCOPIC ABLATION LIVER TUMOR N/A 4/6/2021    Procedure: Laparoscopic ultrasound guided microwave ablation of liver tumor, lysis of adhesions;  Surgeon: Nikolay Chahal MD;  Location: [facility] OR     LAPAROSCOPIC HEPATECTOMY PARTIAL N/A 4/6/2021    Procedure: Laparoscopic partial hepatectomy;  Surgeon: Nikolay Chahal MD;  Location: [facility] OR     LOBECTOMY LUNG      left lower lobe     Cancer History:   As above    Allergies:  Allergies as of 07/29/2021     (No Known Allergies)     Current Medications:  Current Outpatient Medications   Medication Sig Dispense Refill     metoprolol succinate ER (TOPROL-XL) 50 MG 24 hr tablet Take 50 mg by mouth At Bedtime        Multiple Vitamins-Minerals (VITRUM SENIOR) TABS Take 2 tablets by mouth every morning        tamsulosin (FLOMAX) 0.4 MG capsule Take 0.4 mg by mouth At Bedtime        Family History:  Family History   Problem Relation Age of Onset     Anesthesia Reaction No family hx of      Sister with cervical cancer    Social History:  Social History     Socioeconomic History     Marital status:      Spouse name: Not on file     Number of children: Not on file     Years of education: Not on file     Highest education level: Not on file    Occupational History     Not on file   Tobacco Use     Smoking status: Former Smoker     Smokeless tobacco: Never Used     Tobacco comment: distant history   Substance and Sexual Activity     Alcohol use: Not on file     Comment: infrequent     Drug use: Not Currently     Sexual activity: Not on file   Other Topics Concern     Not on file   Social History Narrative     Not on file     Social Determinants of Health     Financial Resource Strain:      Difficulty of Paying Living Expenses:    Food Insecurity:      Worried About Running Out of Food in the Last Year:      Ran Out of Food in the Last Year:    Transportation Needs:      Lack of Transportation (Medical):      Lack of Transportation (Non-Medical):    Physical Activity:      Days of Exercise per Week:      Minutes of Exercise per Session:    Stress:      Feeling of Stress :    Social Connections:      Frequency of Communication with Friends and Family:      Frequency of Social Gatherings with Friends and Family:      Attends Holiness Services:      Active Member of Clubs or Organizations:      Attends Club or Organization Meetings:      Marital Status:    Intimate Partner Violence:      Fear of Current or Ex-Partner:      Emotionally Abused:      Physically Abused:      Sexually Abused:      Quit smoking 50 years ago. Drinks occasional etoh. Lives with wife.      Physical Exam:  There were no vitals taken for this visit.   Wt Readings from Last 4 Encounters:   04/19/21 103.6 kg (228 lb 8 oz)   04/06/21 103.7 kg (228 lb 9.9 oz)       Constitutional.  Looks well and in no apparent distress.   Eyes.  Without eye redness or apparent jaundice.   Respiratory.  Non labored breathing. Speaking in full sentences.    Skin.  No concerning skin rashes on the skin visualized.   Neurological.  Is alert and oriented.  Psychiatric.  Mood and affect seem appropriate.      The rest of a comprehensive physical examination is deferred due to Public Health Emergency video  visit restrictions.      Laboratory/Imaging Studies      Reviewed    7/26/2021.  CBC shows platelets 119.  Rest is unremarkable.    CMP unremarkable.   CEA <0.5.     CT chest abdomen and pelvis on 7/26/2021 showed several new small lung nodules.  These are all subcentimeter in the largest one seems to be in the right lower lobe measuring 8 mm.  Liver lesion shows posttreatment changes without evidence of increase in size or new lesions.  Pancreatic cyst seems a stable.  Retroperitoneal lymph nodes are also stable.  No new or enlarging lymph nodes.      ASSESSMENT/PLAN:    He has metastatic colon cancer to the lungs and the liver.  MSI intact.  K-lana mutated.  He had left lower lobectomy in 2016 and had liver directed therapies including metastatectomy in September 2014 and then again had microwave ablation on 4/6/2021 after FOLFOX/Avastin chemotherapy.  Last chemotherapy was in mid February 2021.    Last MRI on 4/19/2021 shows expected posttreatment changes without evidence of new disease.  He continues to feel well.  Since he has received 10 cycles of chemotherapy which is 6 cycles of FOLFIRI Avastin and 3 cycles of FOLFOX Avastin and 1 cycle of FOLFOX perioperatively, we decided on close observation.      He is doing well.  Repeat CT chest abdomen and pelvis showed stable findings in the liver although there is some lung nodules which are new and they are tiny.  They remain indeterminate although I am concerned that this could be metastatic disease.  At this time I am not certain.    We discussed the situation in detail.  As they are subcentimeter lung nodules, the chances that PET scan would be informative is low so I do not recommend that.  They are so tiny that biopsy would be very difficult.  The other option is to start him on chemotherapy assuming that these are metastatic and indication of progressive disease but at this time I do not believe that we should do that as CEA which was previously high is now  normal and these lung nodules are tiny and indeterminate.  I believe the best option would be to have a very close follow-up CT scan in 2 months to follow-up on these lung nodules and metastatic colon cancer.  He agrees with this approach.  We will plan to repeat labs in CT scan in 2 months.  We also discussed that if these lung nodules are metastatic, then the treatment would be palliative as we will not be able to do surgery as the lung nodules are several and involve both the lungs.  The only option would be chemotherapy.    Thrombocytopenia.  This is mild and is stable and asymptomatic.  This is from previous chemotherapy.  Continue to observe.    Neuropathy.  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  So continue to observe.    Issues with Port-A-Cath.  He mentions that the last time they were not able to draw blood from it.  He will try again in the next few weeks to see if it opens up.  We discussed that at this time it is reasonable to not replace it and retry in a few weeks.  If he definitely has evidence of metastatic disease then he will need to have port placed again if the current one does not work.    We did not address the following  Colon polyps.  Last colonoscopy was in January 2021.  He should have a repeat colonoscopy 1 year from then which would be January 2022.    Neutropenia.  Previously he had chemotherapy induced neutropenia requiring Neulasta support.    Port.  He has port in.  He will get port flushed in [place] every 8 weeks.      Return to clinic in a couple of months.    I answered all of his and his wife's questions to their satisfaction.  They are agreeable and comfortable with the plan.    Bibi Howe MD     Video start time,. 10:18 AM  Video stop time.  10:36 AM.

## 2021-07-29 NOTE — LETTER
7/29/2021         RE: Hernandez Aragon  [address]  [place] [address]        Dear Colleague,    Thank you for referring your patient, Hernandez Aragon, to the [facility]. Please see a copy of my visit note below.    Hernandez is a 71 year old who is being evaluated via a billable video visit.      How would you like to obtain your AVS? MyChart  If the video visit is dropped, the invitation should be resent by: Text to cell phone: [phone]  Will anyone else be joining your video visit? Yes: Wife. How would they like to receive their invitation? Other e-mail: n/a          Oncology follow up visit:  Date on this visit: 7/29/21     Hernandez Aragon  is referred by Dr.Eric Mychal Chahal for an oncology consultation. He requires evaluation for metastatic colon cancer.    Primary Physician: Michael Nichols     History Of Present Illness:  See my previous note for details.  I have copied and updated from prior note.      Mr. Aragon is a 70 year old male who presents with metastatic colon cancer.  I have reviewed notes from Dr. Chahal as well as outside oncologist Dr. Hawthorne and Dr Bouchra Hanks from [facility].    His oncologic history is summarized below.  In October 2013 he had a screening colonoscopy which showed a 1.5 cm well-differentiated adenocarcinoma at the base of large tubulovillous adenoma which invaded the muscularis propria.  There was no evidence of metastatic disease.    On 10/29/2013 he underwent laparoscopic hand-assisted right hemicolectomy.  All 16 lymph nodes were negative.  It was classified as stage I pT1pN0 lesion.    5/20/2014.  He was noted to have metastatic adenocarcinoma to the liver which was biopsy-proven.    He then had FOLFOX with Panitumumab x 3 cycles followed by resection of the liver metastasis in September 2014.    He completed 9 additional cycles and the last one was on 2/4/2015.  Dose of oxaliplatin was decreased by 50% due to neuropathy.    In the fall  2016 he was noted to have left lung lower lobe meta stasis which was resected on 10/6/2016. No chemo was given perioperatively.      5/21/2020.  CT scan showed a lesion in the dome of the liver measuring 4.2 cm as well as a 2.1 cm subcapsular lesion in hepatic segment 7/8 which is indeterminate.    7/6/2020.  CT-guided liver biopsy confirmed metastatic moderately differentiated colon adenocarcinoma.  MSI intact.  K-lana mutated.  CEA was 10.3.    He was started on FOLFIRI plus Avastin on 8/18/2020 and he completed 6 cycles on 10/27/2020.  Neulasta was given due to neutropenia.   11/13/2020.  Showed mild improvement in the liver lesions.    As the response was not very prominent, his chemotherapy was changed to FOLFOX plus Avastin plus he was tolerating FOLFIRI plus Avastin poorly. He was noticing mouth sores, nose bleeds, hemorrhoids, general fatigue.       2/11/2021.  After completing 3 cycles of FOLFOX/Avastin, MRI of the liver showed decrease in size of the lesion in the dome of the liver.  CT chest abdomen pelvis showed that the largest lesion measures 29 x 35 mm while previously it was 33 x 38 mm in November 2020.  Other liver lesions were stable.  Lung nodules were stable.    He received cycle #4 on 2/16/2021 FOLFOX.  Avastin was stopped.  He tolerated chemo well.       4/6/2021.  He underwent diagnostic laparoscopy and extensive lysis of adhesions and ultrasound-guided percutaneous microwave ablation of the liver tumor in segment 8/4A.  It was about 3.5 cm in size.    4/19/2021.  Follow-up MRI of the abdomen showed post microwave ablation changes along the segment 4A/8 with  associated inherent T1 hyperintense signal, likely representing coagulative necrosis. No definite evidence of residual/recurrent tumor. New bland thrombus of the distal portal vein branch of the  segment 8, likely treatment-related.  Stable in size of the subcapsular inherent T1 hyperintense, mild T2 hyperintense foci without definite  enhancement in the segment 7/8  measures 2 cm and segment 6 measures 2.3 cm, compatible with previously treated metastatic lesions. No new hepatic lesion.   Unchanged several cystic foci in the pancreas since prior MRI, largest is measuring 1.5 cm in the pancreatic tail. No associated worrisome imaging features. 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    Since he had received 10 cycles of chemotherapy which is 6 cycles of FOLFIRI Avastin and 3 cycles of FOLFOX Avastin and 1 cycle of FOLFOX perioperatively, we decided on close observation.        Interval history.  This is a video visit.  He is doing well. On 7/26/2021 his port was unable to be accessed but they were not able to draw blood. They were able to flush it though.  Neuropathy is mild. He notices some tenderness on bare feet. Otherwise does not notice it. Hands have occasional tingling in the tips.  No abdominal pain or GI symptoms. No weight loss. Energy is very good. No respiratory complaints.      ECOG 0    ROS:  Rest of the comprehensive review of the system was otherwise unremarkable.      Past Medical/Surgical History:  Past Medical History:   Diagnosis Date     HTN (hypertension)      Thrombocytopenia (H)    BPH   Past Surgical History:   Procedure Laterality Date     COLONOSCOPY       HEMICOLECTOMY, RT/LT       LAPAROSCOPIC ABLATION LIVER TUMOR N/A 4/6/2021    Procedure: Laparoscopic ultrasound guided microwave ablation of liver tumor, lysis of adhesions;  Surgeon: Nikolay Chahal MD;  Location: [facility] OR     LAPAROSCOPIC HEPATECTOMY PARTIAL N/A 4/6/2021    Procedure: Laparoscopic partial hepatectomy;  Surgeon: Nikolay Chahal MD;  Location: [facility] OR     LOBECTOMY LUNG      left lower lobe     Cancer History:   As above    Allergies:  Allergies as of 07/29/2021     (No Known Allergies)     Current Medications:  Current Outpatient Medications   Medication Sig Dispense Refill     metoprolol succinate ER  (TOPROL-XL) 50 MG 24 hr tablet Take 50 mg by mouth At Bedtime        Multiple Vitamins-Minerals (VITRUM SENIOR) TABS Take 2 tablets by mouth every morning        tamsulosin (FLOMAX) 0.4 MG capsule Take 0.4 mg by mouth At Bedtime        Family History:  Family History   Problem Relation Age of Onset     Anesthesia Reaction No family hx of      Sister with cervical cancer    Social History:  Social History     Socioeconomic History     Marital status:      Spouse name: Not on file     Number of children: Not on file     Years of education: Not on file     Highest education level: Not on file   Occupational History     Not on file   Tobacco Use     Smoking status: Former Smoker     Smokeless tobacco: Never Used     Tobacco comment: distant history   Substance and Sexual Activity     Alcohol use: Not on file     Comment: infrequent     Drug use: Not Currently     Sexual activity: Not on file   Other Topics Concern     Not on file   Social History Narrative     Not on file     Social Determinants of Health     Financial Resource Strain:      Difficulty of Paying Living Expenses:    Food Insecurity:      Worried About Running Out of Food in the Last Year:      Ran Out of Food in the Last Year:    Transportation Needs:      Lack of Transportation (Medical):      Lack of Transportation (Non-Medical):    Physical Activity:      Days of Exercise per Week:      Minutes of Exercise per Session:    Stress:      Feeling of Stress :    Social Connections:      Frequency of Communication with Friends and Family:      Frequency of Social Gatherings with Friends and Family:      Attends [employer] Services:      Active Member of Clubs or Organizations:      Attends Club or Organization Meetings:      Marital Status:    Intimate Partner Violence:      Fear of Current or Ex-Partner:      Emotionally Abused:      Physically Abused:      Sexually Abused:      Quit smoking 50 years ago. Drinks occasional etoh. Lives with  wife.      Physical Exam:  There were no vitals taken for this visit.   Wt Readings from Last 4 Encounters:   04/19/21 103.6 kg (228 lb 8 oz)   04/06/21 103.7 kg (228 lb 9.9 oz)       Constitutional.  Looks well and in no apparent distress.   Eyes.  Without eye redness or apparent jaundice.   Respiratory.  Non labored breathing. Speaking in full sentences.    Skin.  No concerning skin rashes on the skin visualized.   Neurological.  Is alert and oriented.  Psychiatric.  Mood and affect seem appropriate.      The rest of a comprehensive physical examination is deferred due to [facility] video visit restrictions.      Laboratory/Imaging Studies      Reviewed    7/26/2021.  CBC shows platelets 119.  Rest is unremarkable.    CMP unremarkable.   CEA <0.5.     CT chest abdomen and pelvis on 7/26/2021 showed several new small lung nodules.  These are all subcentimeter in the largest one seems to be in the right lower lobe measuring 8 mm.  Liver lesion shows posttreatment changes without evidence of increase in size or new lesions.  Pancreatic cyst seems a stable.  Retroperitoneal lymph nodes are also stable.  No new or enlarging lymph nodes.      ASSESSMENT/PLAN:    He has metastatic colon cancer to the lungs and the liver.  MSI intact.  K-lana mutated.  He had left lower lobectomy in 2016 and had liver directed therapies including metastatectomy in September 2014 and then again had microwave ablation on 4/6/2021 after FOLFOX/Avastin chemotherapy.  Last chemotherapy was in mid February 2021.    Last MRI on 4/19/2021 shows expected posttreatment changes without evidence of new disease.  He continues to feel well.  Since he has received 10 cycles of chemotherapy which is 6 cycles of FOLFIRI Avastin and 3 cycles of FOLFOX Avastin and 1 cycle of FOLFOX perioperatively, we decided on close observation.      He is doing well.  Repeat CT chest abdomen and pelvis showed stable findings in the liver although there is  some lung nodules which are new and they are tiny.  They remain indeterminate although I am concerned that this could be metastatic disease.  At this time I am not certain.    We discussed the situation in detail.  As they are subcentimeter lung nodules, the chances that PET scan would be informative is low so I do not recommend that.  They are so tiny that biopsy would be very difficult.  The other option is to start him on chemotherapy assuming that these are metastatic and indication of progressive disease but at this time I do not believe that we should do that as CEA which was previously high is now normal and these lung nodules are tiny and indeterminate.  I believe the best option would be to have a very close follow-up CT scan in 2 months to follow-up on these lung nodules and metastatic colon cancer.  He agrees with this approach.  We will plan to repeat labs in CT scan in 2 months.  We also discussed that if these lung nodules are metastatic, then the treatment would be palliative as we will not be able to do surgery as the lung nodules are several and involve both the lungs.  The only option would be chemotherapy.    Thrombocytopenia.  This is mild and is stable and asymptomatic.  This is from previous chemotherapy.  Continue to observe.    Neuropathy.  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  So continue to observe.    Issues with Port-A-Cath.  He mentions that the last time they were not able to draw blood from it.  He will try again in the next few weeks to see if it opens up.  We discussed that at this time it is reasonable to not replace it and retry in a few weeks.  If he definitely has evidence of metastatic disease then he will need to have port placed again if the current one does not work.    We did not address the following  Colon polyps.  Last colonoscopy was in January 2021.  He should have a repeat colonoscopy 1 year from then which would be January  2022.    Neutropenia.  Previously he had chemotherapy induced neutropenia requiring Neulasta support.    Port.  He has port in.  He will get port flushed in [place] every 8 weeks.      Return to clinic in a couple of months.    I answered all of his and his wife's questions to their satisfaction.  They are agreeable and comfortable with the plan.    Bibi Howe MD     Video start time,. 10:18 AM  Video stop time.  10:36 AM.      Again, thank you for allowing me to participate in the care of your patient.        Sincerely,        Bibi Howe MD

## 2021-07-29 NOTE — PROGRESS NOTES
Hernandez is a 71 year old who is being evaluated via a billable video visit.      How would you like to obtain your AVS? [employer]  If the video visit is dropped, the invitation should be resent by: Text to cell phone: [phone]  Will anyone else be joining your video visit? Yes: Wife. How would they like to receive their invitation? Other e-mail: n/a

## 2021-08-25 ENCOUNTER — TELEPHONE (OUTPATIENT)
Dept: ONCOLOGY | Facility: CLINIC | Age: 71
End: 2021-08-25

## 2021-08-25 NOTE — TELEPHONE ENCOUNTER
RN CARE COORDINATION      Date Received in Clinic:  n/a  Name/Type of Form: CT scan and lab orders  Date Completed: 8/25/2021  Copy Mailed to patient: no  Disposition of Form: Faxed to Dr. Michael Rivas with [facility] in [place]      Plan for labs and CT scan prior to 9/30 visit with Dr. Howe. Hernandez is aware of the plan.     ANMOL Catherine, RN  RN Care Coordinator  [facility]

## 2021-09-20 ENCOUNTER — TRANSFERRED RECORDS (OUTPATIENT)
Dept: HEALTH INFORMATION MANAGEMENT | Facility: CLINIC | Age: 71
End: 2021-09-20

## 2021-09-23 ENCOUNTER — TRANSFERRED RECORDS (OUTPATIENT)
Dept: HEALTH INFORMATION MANAGEMENT | Facility: CLINIC | Age: 71
End: 2021-09-23

## 2021-09-24 LAB
ALBUMIN (EXTERNAL): 4.3 G/DL (ref 3.5–5)
ALKALINE PHOSPHATASE (EXTERNAL): 103 U/L (ref 40–134)
ALT SERPL-CCNC: 25 U/L (ref 0–55)
ANION GAP SERPL CALC-SCNC: NORMAL MMOL/L
AST SERPL-CCNC: 32 U/L (ref 5–40)
BASOPHILS # BLD AUTO: 0.04 K/UL
BASOPHILS NFR BLD AUTO: 0.7 %
BILIRUB SERPL-MCNC: 0.7 MG/DL (ref 0.1–1.5)
BUN SERPL-MCNC: 22 MG/DL (ref 5–23)
CALCIUM (EXTERNAL): 9.5 MG/DL (ref 8.4–10.4)
CHLORIDE (EXTERNAL): 104 MMOL/L (ref 98–107)
CO2 (EXTERNAL): 26 MMOL/L (ref 20–30)
CREATININE (EXTERNAL): 1.1 MG/DL (ref 0.5–1.5)
DIFFERENTIAL: ABNORMAL
EOSINOPHIL # BLD AUTO: 0.1 K/UL
EOSINOPHIL NFR BLD AUTO: 1.8 %
ERYTHROCYTE [DISTWIDTH] IN BLOOD BY AUTOMATED COUNT: 12.7 % (ref 11.5–14.5)
GFR ESTIMATED (EXTERNAL): >60 ML/MIN/1.73M2
GFR ESTIMATED (IF AFRICAN AMERICAN) (EXTERNAL): NORMAL ML/MIN/1.73M2
GLUCOSE (EXTERNAL): 92 MG/DL (ref 70–180)
HEMATOCRIT (EXTERNAL): 46.3 % (ref 41–54)
HEMOGLOBIN (EXTERNAL): 15.6 G/DL (ref 13.5–17.9)
IMMATURE GRANS (ABS): 0.02 K/UL
IMMATURE GRANULOCYTES: 0.4 %
LYMPHOCYTES # BLD AUTO: 1.6 K/UL (ref 1–4)
LYMPHOCYTES NFR BLD AUTO: 29.4 %
MCH RBC QN AUTO: 32 PG (ref 27–33)
MCHC RBC AUTO-ENTMCNC: 33 G/DL (ref 32–36.5)
MCV RBC AUTO: 94.9 FL (ref 80–100)
MONOCYTES # BLD AUTO: 0.48 K/UL (ref 0.1–1)
MONOCYTES NFR BLD AUTO: 8.8 %
NEUTROPHILS # BLD AUTO: 3.21 K/UL (ref 2–7.7)
NEUTROPHILS NFR BLD AUTO: 58.5 %
NRBC: 0
PLATELET COUNT (EXTERNAL): 134 10E3/UL (ref 150–450)
PMV BLD: 9.8 FL (ref 7.4–10.4)
POTASSIUM (EXTERNAL): 4.2 MMOL/L (ref 3.5–5)
PROTEIN TOTAL (EXTERNAL): 7 G/DL (ref 6–8.2)
RBC # BLD AUTO: 4.88 10E6/UL (ref 4.6–6.2)
SODIUM (EXTERNAL): 141 MMOL/L (ref 136–145)
WBC COUNT (EXTERNAL): 5.45 10E3/UL (ref 4–11)

## 2021-09-28 LAB — CEA SERPL-MCNC: <1.73 UG/L (ref 0–?)

## 2021-10-07 ENCOUNTER — VIRTUAL VISIT (OUTPATIENT)
Dept: ONCOLOGY | Facility: CLINIC | Age: 71
End: 2021-10-07
Attending: INTERNAL MEDICINE
Payer: COMMERCIAL

## 2021-10-07 DIAGNOSIS — C78.00 COLON CANCER METASTASIZED TO LUNG (H): ICD-10-CM

## 2021-10-07 DIAGNOSIS — C78.7 COLON CANCER METASTASIZED TO LIVER (H): Primary | ICD-10-CM

## 2021-10-07 DIAGNOSIS — C18.9 COLON CANCER METASTASIZED TO LIVER (H): Primary | ICD-10-CM

## 2021-10-07 DIAGNOSIS — C18.9 COLON CANCER METASTASIZED TO LUNG (H): ICD-10-CM

## 2021-10-07 PROCEDURE — 999N001193 HC VIDEO/TELEPHONE VISIT; NO CHARGE

## 2021-10-07 PROCEDURE — 99215 OFFICE O/P EST HI 40 MIN: CPT | Mod: 95 | Performed by: INTERNAL MEDICINE

## 2021-10-07 NOTE — LETTER
10/7/2021         RE: Hernandez Aragon  [address]  [place] [address]        Dear Colleague,    Thank you for referring your patient, Hernandez Aragon, to the [facility]. Please see a copy of my visit note below.    Hernandez is a 71 year old who is being evaluated via a billable video visit.      How would you like to obtain your AVS? MyChart  If the video visit is dropped, the invitation should be resent by: Text to cell phone: [phone]  Will anyone else be joining your video visit? No    Video Start Time: 2:57 PM  Video-Visit Details    Type of service:  Video Visit    Video End Time:3:39 PM    Originating Location (pt. Location): Home    Distant Location (provider location):  [facility]     Platform used for Video Visit: [employer]    Oncology follow up visit:  Date on this visit: 10/07/21     Hernandez Aragon  is referred by Dr.Eric Mychal Chahal for an oncology consultation. He requires evaluation for metastatic colon cancer.    Primary Physician: Michael Nichols     History Of Present Illness:  See my previous note for details.  I have copied and updated from prior note.      Mr. Aragon is a 71 year old male who presents with metastatic colon cancer.  I have reviewed notes from Dr. Chahal as well as outside oncologist Dr. Hawthorne and Dr Bouchra Hanks from [facility].    His oncologic history is summarized below.  In October 2013 he had a screening colonoscopy which showed a 1.5 cm well-differentiated adenocarcinoma at the base of large tubulovillous adenoma which invaded the muscularis propria.  There was no evidence of metastatic disease.    On 10/29/2013 he underwent laparoscopic hand-assisted right hemicolectomy.  All 16 lymph nodes were negative.  It was classified as stage I pT1pN0 lesion.    5/20/2014.  He was noted to have metastatic adenocarcinoma to the liver which was biopsy-proven.    He then had FOLFOX with Panitumumab x 3 cycles followed  by resection of the liver metastasis in September 2014.    He completed 9 additional cycles and the last one was on 2/4/2015.  Dose of oxaliplatin was decreased by 50% due to neuropathy.    In the fall 2016 he was noted to have left lung lower lobe metastasis which was resected on 10/6/2016. No chemo was given perioperatively.      5/21/2020.  CT scan showed a lesion in the dome of the liver measuring 4.2 cm as well as a 2.1 cm subcapsular lesion in hepatic segment 7/8 which is indeterminate.    7/6/2020.  CT-guided liver biopsy confirmed metastatic moderately differentiated colon adenocarcinoma.  MSI intact.  K-lana mutated.  CEA was 10.3.    He was started on FOLFIRI plus Avastin on 8/18/2020 and he completed 6 cycles on 10/27/2020.  Neulasta was given due to neutropenia.   11/13/2020.  Showed mild improvement in the liver lesions.    As the response was not very prominent, his chemotherapy was changed to FOLFOX plus Avastin plus he was tolerating FOLFIRI plus Avastin poorly. He was noticing mouth sores, nose bleeds, hemorrhoids, general fatigue.       2/11/2021.  After completing 3 cycles of FOLFOX/Avastin, MRI of the liver showed decrease in size of the lesion in the dome of the liver.  CT chest abdomen pelvis showed that the largest lesion measures 29 x 35 mm while previously it was 33 x 38 mm in November 2020.  Other liver lesions were stable.  Lung nodules were stable.    He received cycle #4 on 2/16/2021 FOLFOX.  Avastin was stopped.  He tolerated chemo well.       4/6/2021.  He underwent diagnostic laparoscopy and extensive lysis of adhesions and ultrasound-guided percutaneous microwave ablation of the liver tumor in segment 8/4A.  It was about 3.5 cm in size.    4/19/2021.  Follow-up MRI of the abdomen showed post microwave ablation changes along the segment 4A/8 with  associated inherent T1 hyperintense signal, likely representing coagulative necrosis. No definite evidence of residual/recurrent tumor. New  bland thrombus of the distal portal vein branch of the  segment 8, likely treatment-related.  Stable in size of the subcapsular inherent T1 hyperintense, mild T2 hyperintense foci without definite enhancement in the segment 7/8  measures 2 cm and segment 6 measures 2.3 cm, compatible with previously treated metastatic lesions. No new hepatic lesion.   Unchanged several cystic foci in the pancreas since prior MRI, largest is measuring 1.5 cm in the pancreatic tail. No associated worrisome imaging features. 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    Since he had received 10 cycles of chemotherapy which is 6 cycles of FOLFIRI Avastin and 3 cycles of FOLFOX Avastin and 1 cycle of FOLFOX perioperatively, we decided on close observation.        Interval history.  This is a video visit. His wife is also available.  He feels well but recently had a tooth infection and taking Amoxicillin for that. No pain. No GI complaints. No bleeding. No respiratory complaints. No new swellings.   Neuropathy is mild with occasional tingling in the tips or fingers/toes. energy is decent and no weight loss. Port is not working well.       ECOG 0    ROS:  Rest of the comprehensive review of the system was otherwise unremarkable.     I reviewed other hx in Epic as below.      Past Medical/Surgical History:  Past Medical History:   Diagnosis Date     HTN (hypertension)      Thrombocytopenia (H)    BPH   Past Surgical History:   Procedure Laterality Date     COLONOSCOPY       HEMICOLECTOMY, RT/LT       LAPAROSCOPIC ABLATION LIVER TUMOR N/A 4/6/2021    Procedure: Laparoscopic ultrasound guided microwave ablation of liver tumor, lysis of adhesions;  Surgeon: Nikolay Chahal MD;  Location: [facility] OR     LAPAROSCOPIC HEPATECTOMY PARTIAL N/A 4/6/2021    Procedure: Laparoscopic partial hepatectomy;  Surgeon: Nikolay Chahal MD;  Location: [facility] OR     LOBECTOMY LUNG      left lower lobe     Cancer  History:   As above    Allergies:  Allergies as of 10/07/2021     (No Known Allergies)     Current Medications:  Current Outpatient Medications   Medication Sig Dispense Refill     metoprolol succinate ER (TOPROL-XL) 50 MG 24 hr tablet Take 50 mg by mouth At Bedtime        Multiple Vitamins-Minerals (VITRUM SENIOR) TABS Take 2 tablets by mouth every morning        tamsulosin (FLOMAX) 0.4 MG capsule Take 0.4 mg by mouth At Bedtime        Family History:  Family History   Problem Relation Age of Onset     Anesthesia Reaction No family hx of      Sister with cervical cancer    Social History:  Social History     Socioeconomic History     Marital status:      Spouse name: Not on file     Number of children: Not on file     Years of education: Not on file     Highest education level: Not on file   Occupational History     Not on file   Tobacco Use     Smoking status: Former Smoker     Smokeless tobacco: Never Used     Tobacco comment: distant history   Substance and Sexual Activity     Alcohol use: Not on file     Comment: infrequent     Drug use: Not Currently     Sexual activity: Not on file   Other Topics Concern     Not on file   Social History Narrative     Not on file     Social Determinants of Health     Financial Resource Strain:      Difficulty of Paying Living Expenses:    Food Insecurity:      Worried About Running Out of Food in the Last Year:      Ran Out of Food in the Last Year:    Transportation Needs:      Lack of Transportation (Medical):      Lack of Transportation (Non-Medical):    Physical Activity:      Days of Exercise per Week:      Minutes of Exercise per Session:    Stress:      Feeling of Stress :    Social Connections:      Frequency of Communication with Friends and Family:      Frequency of Social Gatherings with Friends and Family:      Attends [employer] Services:      Active Member of Clubs or Organizations:      Attends Club or Organization Meetings:      Marital Status:     Intimate Partner Violence:      Fear of Current or Ex-Partner:      Emotionally Abused:      Physically Abused:      Sexually Abused:      Quit smoking 50 years ago. Drinks occasional etoh. Lives with wife.      Physical Exam:  There were no vitals taken for this visit.   Wt Readings from Last 4 Encounters:   04/19/21 103.6 kg (228 lb 8 oz)   04/06/21 103.7 kg (228 lb 9.9 oz)       Constitutional.  Looks well and in no apparent distress.   Eyes.  Without eye redness or apparent jaundice.   Respiratory.  Non labored breathing. Speaking in full sentences.    Skin.  No concerning skin rashes on the skin visualized.   Neurological.  Is alert and oriented.  Psychiatric.  Mood and affect seem appropriate.      The rest of a comprehensive physical examination is deferred due to Public Health Emergency video visit restrictions.      Laboratory/Imaging Studies      Reviewed    9/23/2021.  CBC shows platelets 134.  Rest is unremarkable.    CMP unremarkable.   CEA <1.73.     CT chest abdomen and pelvis on 9/22/2021 showed progression and lung nodules.  Almost all of them seem to have progressed some and this is consistent with worsening metastatic disease.  Liver lesions are stable.        CT chest abdomen and pelvis on 7/26/2021 showed several new small lung nodules.  These are all subcentimeter in the largest one seems to be in the right lower lobe measuring 8 mm.  Liver lesion shows posttreatment changes without evidence of increase in size or new lesions.  Pancreatic cyst seems a stable.  Retroperitoneal lymph nodes are also stable.  No new or enlarging lymph nodes.      ASSESSMENT/PLAN:    He has metastatic colon cancer to the lungs and the liver.  MSI intact.  K-lana mutated.  He had left lower lobectomy in 2016 and had liver directed therapies including metastatectomy in September 2014 and then again had microwave ablation on 4/6/2021 after FOLFOX/Avastin chemotherapy.  Last chemotherapy was in mid February  2021.    Last MRI on 4/19/2021 shows expected posttreatment changes without evidence of new disease.      Since he has received 10 cycles of chemotherapy which is 6 cycles of FOLFIRI Avastin and 3 cycles of FOLFOX Avastin and 1 cycle of FOLFOX perioperatively, we decided on close observation.      In July 2021, CT chest abdomen and pelvis showed stable findings in the liver although there were some lung nodules which were new and tiny and were indeterminate but I was concerned that this could be metastatic disease although at that time we will not certain.  We decided on close observation with repeat CT scan in a couple of months.    Repeat CT scan which was done in September 2021 showed the lung nodules to be increasing in size consistent with worsening metastatic disease.    We discussed the situation in detail.  I believe that because of worsening metastatic disease, this is incurable cancer and the treatment would be palliative in nature.      We discussed option of restarting the chemotherapy.  One option would be to try FOLFOX/Avastin.  Currently his port is not functioning well.  The other option would be to try Xeloda with oxaliplatin and Avastin.  We discussed the rational schedule and potential side effects of these regimens.  Previously he had chemotherapy induced neutropenia so very we will give him Neulasta support as he was receiving previously.    The other option would be to hold off treatment right now considering he is completely asymptomatic and repeat CT scan in a couple of months.  This carries higher risk of cancer progressing and causing more problems.      He wants to think about his options before deciding and he wants to follow-up with me in a couple of weeks to discuss this again.      I will have him see me in clinic in 2 weeks.      Neuropathy.  He does have mild neuropathy in the fingertips and toes and with the start of oxaliplatin, this will need to be monitored very  closely.      Thrombocytopenia.  He has mild thrombocytopenia from previous chemotherapy.  This has been stable.  He does not have any bleeding.  Reasonable to monitor.     Port-A-Cath malfunction.  We discussed that if he chose FOLFOX, then we will need to make sure that he has well-functioning port.  If you choose Xeloda then potentially we can use peripheral IV lines for oxaliplatin/Avastin.    As mentioned above he wants to discuss about the options among his family members and then decide.      We did not address the following  Colon polyps.  Last colonoscopy was in January 2021.  He should have a repeat colonoscopy 1 year from then which would be January 2022.      Return to clinic in 2 weeks.    I answered all of his and his wife's questions to their satisfaction.  They are agreeable and comfortable with the plan.    Bibi Howe MD     I spent 55 minutes on this visit on the date of service, including the video time, reviewing records and labs and imaging and placing new orders as well as coordination of care and documentation.          Again, thank you for allowing me to participate in the care of your patient.        Sincerely,        Bibi Howe MD

## 2021-10-07 NOTE — PROGRESS NOTES
Oncology follow up visit:  Date on this visit: 10/07/21     Hernandez Aragon  is referred by Dr.Eric Mychal Chahal for an oncology consultation. He requires evaluation for metastatic colon cancer.    Primary Physician: Michael Nichols     History Of Present Illness:  See my previous note for details.  I have copied and updated from prior note.      Mr. Aragon is a 71 year old male who presents with metastatic colon cancer.  I have reviewed notes from Dr. Chahal as well as outside oncologist Dr. Hawthorne and Dr Bouchra Hanks from [facility].    His oncologic history is summarized below.  In October 2013 he had a screening colonoscopy which showed a 1.5 cm well-differentiated adenocarcinoma at the base of large tubulovillous adenoma which invaded the muscularis propria.  There was no evidence of metastatic disease.    On 10/29/2013 he underwent laparoscopic hand-assisted right hemicolectomy.  All 16 lymph nodes were negative.  It was classified as stage I pT1pN0 lesion.    5/20/2014.  He was noted to have metastatic adenocarcinoma to the liver which was biopsy-proven.    He then had FOLFOX with Panitumumab x 3 cycles followed by resection of the liver metastasis in September 2014.    He completed 9 additional cycles and the last one was on 2/4/2015.  Dose of oxaliplatin was decreased by 50% due to neuropathy.    In the fall 2016 he was noted to have left lung lower lobe metastasis which was resected on 10/6/2016. No chemo was given perioperatively.      5/21/2020.  CT scan showed a lesion in the dome of the liver measuring 4.2 cm as well as a 2.1 cm subcapsular lesion in hepatic segment 7/8 which is indeterminate.    7/6/2020.  CT-guided liver biopsy confirmed metastatic moderately differentiated colon adenocarcinoma.  MSI intact.  K-lana mutated.  CEA was 10.3.    He was started on FOLFIRI plus Avastin on 8/18/2020 and he completed 6 cycles on 10/27/2020.  Neulasta was given due to neutropenia.   11/13/2020.  Showed mild  improvement in the liver lesions.    As the response was not very prominent, his chemotherapy was changed to FOLFOX plus Avastin plus he was tolerating FOLFIRI plus Avastin poorly. He was noticing mouth sores, nose bleeds, hemorrhoids, general fatigue.       2/11/2021.  After completing 3 cycles of FOLFOX/Avastin, MRI of the liver showed decrease in size of the lesion in the dome of the liver.  CT chest abdomen pelvis showed that the largest lesion measures 29 x 35 mm while previously it was 33 x 38 mm in November 2020.  Other liver lesions were stable.  Lung nodules were stable.    He received cycle #4 on 2/16/2021 FOLFOX.  Avastin was stopped.  He tolerated chemo well.       4/6/2021.  He underwent diagnostic laparoscopy and extensive lysis of adhesions and ultrasound-guided percutaneous microwave ablation of the liver tumor in segment 8/4A.  It was about 3.5 cm in size.    4/19/2021.  Follow-up MRI of the abdomen showed post microwave ablation changes along the segment 4A/8 with  associated inherent T1 hyperintense signal, likely representing coagulative necrosis. No definite evidence of residual/recurrent tumor. New bland thrombus of the distal portal vein branch of the  segment 8, likely treatment-related.  Stable in size of the subcapsular inherent T1 hyperintense, mild T2 hyperintense foci without definite enhancement in the segment 7/8  measures 2 cm and segment 6 measures 2.3 cm, compatible with previously treated metastatic lesions. No new hepatic lesion.   Unchanged several cystic foci in the pancreas since prior MRI, largest is measuring 1.5 cm in the pancreatic tail. No associated worrisome imaging features. 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    Since he had received 10 cycles of chemotherapy which is 6 cycles of FOLFIRI Avastin and 3 cycles of FOLFOX Avastin and 1 cycle of FOLFOX perioperatively, we decided on close observation.        Interval  history.  This is a video visit. His wife is also available.  He feels well but recently had a tooth infection and taking Amoxicillin for that. No pain. No GI complaints. No bleeding. No respiratory complaints. No new swellings.   Neuropathy is mild with occasional tingling in the tips or fingers/toes. energy is decent and no weight loss. Port is not working well.       ECOG 0    ROS:  Rest of the comprehensive review of the system was otherwise unremarkable.     I reviewed other hx in Epic as below.      Past Medical/Surgical History:  Past Medical History:   Diagnosis Date     HTN (hypertension)      Thrombocytopenia (H)    BPH   Past Surgical History:   Procedure Laterality Date     COLONOSCOPY       HEMICOLECTOMY, RT/LT       LAPAROSCOPIC ABLATION LIVER TUMOR N/A 4/6/2021    Procedure: Laparoscopic ultrasound guided microwave ablation of liver tumor, lysis of adhesions;  Surgeon: Nikolay Chahal MD;  Location: [facility] OR     LAPAROSCOPIC HEPATECTOMY PARTIAL N/A 4/6/2021    Procedure: Laparoscopic partial hepatectomy;  Surgeon: Nikolay Chahal MD;  Location: [facility] OR     LOBECTOMY LUNG      left lower lobe     Cancer History:   As above    Allergies:  Allergies as of 10/07/2021     (No Known Allergies)     Current Medications:  Current Outpatient Medications   Medication Sig Dispense Refill     metoprolol succinate ER (TOPROL-XL) 50 MG 24 hr tablet Take 50 mg by mouth At Bedtime        Multiple Vitamins-Minerals (VITRUM SENIOR) TABS Take 2 tablets by mouth every morning        tamsulosin (FLOMAX) 0.4 MG capsule Take 0.4 mg by mouth At Bedtime        Family History:  Family History   Problem Relation Age of Onset     Anesthesia Reaction No family hx of      Sister with cervical cancer    Social History:  Social History     Socioeconomic History     Marital status:      Spouse name: Not on file     Number of children: Not on file     Years of education: Not on file     Highest education level: Not on file    Occupational History     Not on file   Tobacco Use     Smoking status: Former Smoker     Smokeless tobacco: Never Used     Tobacco comment: distant history   Substance and Sexual Activity     Alcohol use: Not on file     Comment: infrequent     Drug use: Not Currently     Sexual activity: Not on file   Other Topics Concern     Not on file   Social History Narrative     Not on file     Social Determinants of Health     Financial Resource Strain:      Difficulty of Paying Living Expenses:    Food Insecurity:      Worried About Running Out of Food in the Last Year:      Ran Out of Food in the Last Year:    Transportation Needs:      Lack of Transportation (Medical):      Lack of Transportation (Non-Medical):    Physical Activity:      Days of Exercise per Week:      Minutes of Exercise per Session:    Stress:      Feeling of Stress :    Social Connections:      Frequency of Communication with Friends and Family:      Frequency of Social Gatherings with Friends and Family:      Attends Jewish Services:      Active Member of Clubs or Organizations:      Attends Club or Organization Meetings:      Marital Status:    Intimate Partner Violence:      Fear of Current or Ex-Partner:      Emotionally Abused:      Physically Abused:      Sexually Abused:      Quit smoking 50 years ago. Drinks occasional etoh. Lives with wife.      Physical Exam:  There were no vitals taken for this visit.   Wt Readings from Last 4 Encounters:   04/19/21 103.6 kg (228 lb 8 oz)   04/06/21 103.7 kg (228 lb 9.9 oz)       Constitutional.  Looks well and in no apparent distress.   Eyes.  Without eye redness or apparent jaundice.   Respiratory.  Non labored breathing. Speaking in full sentences.    Skin.  No concerning skin rashes on the skin visualized.   Neurological.  Is alert and oriented.  Psychiatric.  Mood and affect seem appropriate.      The rest of a comprehensive physical examination is deferred due to Public Health Emergency video  visit restrictions.      Laboratory/Imaging Studies      Reviewed    9/23/2021.  CBC shows platelets 134.  Rest is unremarkable.    CMP unremarkable.   CEA <1.73.     CT chest abdomen and pelvis on 9/22/2021 showed progression and lung nodules.  Almost all of them seem to have progressed some and this is consistent with worsening metastatic disease.  Liver lesions are stable.        CT chest abdomen and pelvis on 7/26/2021 showed several new small lung nodules.  These are all subcentimeter in the largest one seems to be in the right lower lobe measuring 8 mm.  Liver lesion shows posttreatment changes without evidence of increase in size or new lesions.  Pancreatic cyst seems a stable.  Retroperitoneal lymph nodes are also stable.  No new or enlarging lymph nodes.      ASSESSMENT/PLAN:    He has metastatic colon cancer to the lungs and the liver.  MSI intact.  K-lana mutated.  He had left lower lobectomy in 2016 and had liver directed therapies including metastatectomy in September 2014 and then again had microwave ablation on 4/6/2021 after FOLFOX/Avastin chemotherapy.  Last chemotherapy was in mid February 2021.    Last MRI on 4/19/2021 shows expected posttreatment changes without evidence of new disease.      Since he has received 10 cycles of chemotherapy which is 6 cycles of FOLFIRI Avastin and 3 cycles of FOLFOX Avastin and 1 cycle of FOLFOX perioperatively, we decided on close observation.      In July 2021, CT chest abdomen and pelvis showed stable findings in the liver although there were some lung nodules which were new and tiny and were indeterminate but I was concerned that this could be metastatic disease although at that time we will not certain.  We decided on close observation with repeat CT scan in a couple of months.    Repeat CT scan which was done in September 2021 showed the lung nodules to be increasing in size consistent with worsening metastatic disease.    We discussed the situation in detail.   I believe that because of worsening metastatic disease, this is incurable cancer and the treatment would be palliative in nature.      We discussed option of restarting the chemotherapy.  One option would be to try FOLFOX/Avastin.  Currently his port is not functioning well.  The other option would be to try Xeloda with oxaliplatin and Avastin.  We discussed the rational schedule and potential side effects of these regimens.  Previously he had chemotherapy induced neutropenia so very we will give him Neulasta support as he was receiving previously.    The other option would be to hold off treatment right now considering he is completely asymptomatic and repeat CT scan in a couple of months.  This carries higher risk of cancer progressing and causing more problems.      He wants to think about his options before deciding and he wants to follow-up with me in a couple of weeks to discuss this again.      I will have him see me in clinic in 2 weeks.      Neuropathy.  He does have mild neuropathy in the fingertips and toes and with the start of oxaliplatin, this will need to be monitored very closely.      Thrombocytopenia.  He has mild thrombocytopenia from previous chemotherapy.  This has been stable.  He does not have any bleeding.  Reasonable to monitor.     Port-A-Cath malfunction.  We discussed that if he chose FOLFOX, then we will need to make sure that he has well-functioning port.  If you choose Xeloda then potentially we can use peripheral IV lines for oxaliplatin/Avastin.    As mentioned above he wants to discuss about the options among his family members and then decide.      We did not address the following  Colon polyps.  Last colonoscopy was in January 2021.  He should have a repeat colonoscopy 1 year from then which would be January 2022.      Return to clinic in 2 weeks.    I answered all of his and his wife's questions to their satisfaction.  They are agreeable and comfortable with the plan.    Bibi  RICCARDO Howe MD     I spent 55 minutes on this visit on the date of service, including the video time, reviewing records and labs and imaging and placing new orders as well as coordination of care and documentation.

## 2021-10-07 NOTE — PROGRESS NOTES
Hernandez is a 71 year old who is being evaluated via a billable video visit.      How would you like to obtain your AVS? MyChart  If the video visit is dropped, the invitation should be resent by: Text to cell phone: [phone]  Will anyone else be joining your video visit? No    Video Start Time: 2:57 PM  Video-Visit Details    Type of service:  Video Visit    Video End Time:3:39 PM    Originating Location (pt. Location): Home    Distant Location (provider location):  [facility]     Platform used for Video Visit: [employer]Well

## 2021-10-11 ENCOUNTER — HEALTH MAINTENANCE LETTER (OUTPATIENT)
Age: 71
End: 2021-10-11

## 2021-10-13 ENCOUNTER — TELEPHONE (OUTPATIENT)
Dept: ONCOLOGY | Facility: CLINIC | Age: 71
End: 2021-10-13

## 2021-10-13 NOTE — TELEPHONE ENCOUNTER
RN CARE COORDINATION      Date Received in Clinic: n/a  Name/Type of Form: [facility] Ref[facility] for [facility] Oncology   Date Completed: 10/13/2021  Copy Mailed to patient: no   Disposition of Form: Faxed to [facility] @ [phone]    Faxed [facility] referral to [facility] per Lori @ [facility]'s request. Per Hernandez Olson is planning to obtain care locally at [facility].         ANMOL Catherine, RN  RN Care Coordinator  [facility]

## 2022-05-22 ENCOUNTER — HEALTH MAINTENANCE LETTER (OUTPATIENT)
Age: 72
End: 2022-05-22

## 2022-09-25 ENCOUNTER — HEALTH MAINTENANCE LETTER (OUTPATIENT)
Age: 72
End: 2022-09-25

## 2023-06-04 ENCOUNTER — HEALTH MAINTENANCE LETTER (OUTPATIENT)
Age: 73
End: 2023-06-04

## (undated) DEVICE — SOL NACL 0.9% IRRIG 1000ML BOTTLE 2F7124

## (undated) DEVICE — SUCTION MANIFOLD NEPTUNE 2 SYS 4 PORT 0702-020-000

## (undated) DEVICE — ESU ENDO SILS HOOK 5MM ARTICULATING MONO SILSHOOK36

## (undated) DEVICE — SURGICEL ABSORBABLE HEMOSTAT SNOW 4"X4" 2083

## (undated) DEVICE — TUBING INSUFFLATION W/FILTER CPC TO LUER 620-030-301

## (undated) DEVICE — ENDO TROCAR BLUNT TIP KII BALLOON 12X130MM C0R50

## (undated) DEVICE — ESU HOLDER LAP INST DISP PURPLE LONG 330MM H-PRO-330

## (undated) DEVICE — PREP CHLORAPREP 26ML TINTED ORANGE  260815

## (undated) DEVICE — SUCTION IRR STRYKERFLOW II W/TIP 250-070-520

## (undated) DEVICE — ADH SKIN CLOSURE PREMIERPRO EXOFIN 1.0ML 3470

## (undated) DEVICE — ENDO TROCAR FIRST ENTRY KII FIOS Z-THRD 12X100MM CTF73

## (undated) DEVICE — LINEN TOWEL PACK X30 5481

## (undated) DEVICE — SOL NACL 0.9% IRRIG 3000ML BAG 2B7477

## (undated) DEVICE — ESU GROUND PAD THERMOGUARD PLUS ABC PEDS 7-382

## (undated) DEVICE — SU VICRYL 1 CT-1 27" UND J261H

## (undated) DEVICE — ENDO TROCAR BLUNT TIP KII BALLOON 12X100MM C0R47

## (undated) DEVICE — GLOVE PROTEXIS BLUE W/NEU-THERA 8.0  2D73EB80

## (undated) DEVICE — ESU GROUND PAD ADULT W/CORD E7507

## (undated) DEVICE — SYSTEM CLEARIFY VISUALIZATION 21-345

## (undated) DEVICE — ENDO TROCAR SLEEVE KII Z-THREADED 05X100MM CTS02

## (undated) DEVICE — SU PDS II 4-0 RB-1 27" Z304H

## (undated) DEVICE — LINEN TOWEL PACK X6 WHITE 5487

## (undated) DEVICE — PROBE PRESSURE NEUWAVE CERTUS 140 15GAX20CM PR20XT

## (undated) DEVICE — ESU HARMONIC ACE LAPAROSCOPIC SHEARS 5MMX36CM CVD HAR36

## (undated) DEVICE — ENDO TROCAR FIRST ENTRY KII FIOS Z-THRD 05X100MM CTF03

## (undated) DEVICE — ESU HARMONIC ACE LAP SHEARS ETHICON ACE+ 7 5MMX36CM HARH36

## (undated) DEVICE — BLADE CLIPPER SGL USE 9680

## (undated) DEVICE — SOL WATER IRRIG 1000ML BOTTLE 2F7114

## (undated) DEVICE — DRAPE IOBAN INCISE 23X17" 6650EZ

## (undated) DEVICE — DEVICE SUTURE GRASPER TROCAR CLOSURE 14GA PMITCSG

## (undated) DEVICE — Device

## (undated) DEVICE — SU VICRYL 3-0 SH 27" UND J416H

## (undated) DEVICE — GLOVE PROTEXIS MICRO 7.5  2D73PM75

## (undated) DEVICE — PROBE ABLATION NEUWAVE CERTUS 140 25CM 13GA SR25

## (undated) DEVICE — NDL INSUFFLATION 13GA 120MM C2201

## (undated) DEVICE — SU MONOCRYL 4-0 PS-2 27" UND Y426H

## (undated) RX ORDER — GLYCOPYRROLATE 0.2 MG/ML
INJECTION, SOLUTION INTRAMUSCULAR; INTRAVENOUS
Status: DISPENSED
Start: 2021-04-06

## (undated) RX ORDER — HYDROMORPHONE HYDROCHLORIDE 1 MG/ML
INJECTION, SOLUTION INTRAMUSCULAR; INTRAVENOUS; SUBCUTANEOUS
Status: DISPENSED
Start: 2021-04-06

## (undated) RX ORDER — LIDOCAINE HYDROCHLORIDE 20 MG/ML
INJECTION, SOLUTION EPIDURAL; INFILTRATION; INTRACAUDAL; PERINEURAL
Status: DISPENSED
Start: 2021-04-06

## (undated) RX ORDER — PROPOFOL 10 MG/ML
INJECTION, EMULSION INTRAVENOUS
Status: DISPENSED
Start: 2021-04-06

## (undated) RX ORDER — FENTANYL CITRATE 50 UG/ML
INJECTION, SOLUTION INTRAMUSCULAR; INTRAVENOUS
Status: DISPENSED
Start: 2021-04-06

## (undated) RX ORDER — FENTANYL CITRATE-0.9 % NACL/PF 10 MCG/ML
PLASTIC BAG, INJECTION (ML) INTRAVENOUS
Status: DISPENSED
Start: 2021-04-06

## (undated) RX ORDER — HYDRALAZINE HYDROCHLORIDE 20 MG/ML
INJECTION INTRAMUSCULAR; INTRAVENOUS
Status: DISPENSED
Start: 2021-04-06

## (undated) RX ORDER — CEFAZOLIN SODIUM 2 G/100ML
INJECTION, SOLUTION INTRAVENOUS
Status: DISPENSED
Start: 2021-04-06

## (undated) RX ORDER — ESMOLOL HYDROCHLORIDE 10 MG/ML
INJECTION INTRAVENOUS
Status: DISPENSED
Start: 2021-04-06

## (undated) RX ORDER — ONDANSETRON 2 MG/ML
INJECTION INTRAMUSCULAR; INTRAVENOUS
Status: DISPENSED
Start: 2021-04-06